# Patient Record
Sex: FEMALE | Race: WHITE | NOT HISPANIC OR LATINO | Employment: FULL TIME | URBAN - METROPOLITAN AREA
[De-identification: names, ages, dates, MRNs, and addresses within clinical notes are randomized per-mention and may not be internally consistent; named-entity substitution may affect disease eponyms.]

---

## 2022-04-12 ENCOUNTER — MEDICAL CORRESPONDENCE (OUTPATIENT)
Dept: HEALTH INFORMATION MANAGEMENT | Facility: CLINIC | Age: 37
End: 2022-04-12
Payer: COMMERCIAL

## 2022-04-19 ENCOUNTER — TRANSCRIBE ORDERS (OUTPATIENT)
Dept: OTHER | Age: 37
End: 2022-04-19

## 2022-04-19 DIAGNOSIS — R49.0 HOARSENESS OF VOICE: Primary | ICD-10-CM

## 2022-05-10 ENCOUNTER — TRANSFERRED RECORDS (OUTPATIENT)
Dept: HEALTH INFORMATION MANAGEMENT | Facility: CLINIC | Age: 37
End: 2022-05-10

## 2022-05-25 NOTE — TELEPHONE ENCOUNTER
FUTURE VISIT INFORMATION      FUTURE VISIT INFORMATION:    Date: 8/22/22    Time: 9AM    [place] [facility] [place]:    Referring provider:  Dr Christopher Clark    Referring providers clinic:  [facility]     Reason for visit/diagnosis  Hoarseness of voice [R49.0], referred by Christopher Clark in GENERIC EXTERNAL DATA DEPARTMENT, recds in epic per pt    RECORDS REQUESTED FROM:       Clinic name Comments Records Status Imaging Status   [facility]   MRN: [id number] 4/11/22, 5/10/21 note from Dr Clark   5/13/21 SLP note from Randa Cazares SLP   req 5/25/22 - received 6/2/22 5/25/22 10:49AM Sent a fax to [facility] for recs - Amay   6/2/22 10:34AM received recs, sent to scan. Will need to req more recent recs closer to appt - Amay   6/29/22 12PM re-sent 22 pages of recs to scan as they are not scanned in the chart - Amay

## 2022-08-09 ENCOUNTER — TELEPHONE (OUTPATIENT)
Dept: OTOLARYNGOLOGY | Facility: CLINIC | Age: 37
End: 2022-08-09

## 2022-08-09 NOTE — TELEPHONE ENCOUNTER
Left vm message for patient in regards to offering a sooner appt. Writers call back number provided on vm.

## 2022-08-22 ENCOUNTER — VIRTUAL VISIT (OUTPATIENT)
Dept: OTOLARYNGOLOGY | Facility: CLINIC | Age: 37
End: 2022-08-22
Attending: OTOLARYNGOLOGY
Payer: COMMERCIAL

## 2022-08-22 ENCOUNTER — TELEPHONE (OUTPATIENT)
Dept: OTOLARYNGOLOGY | Facility: CLINIC | Age: 37
End: 2022-08-22

## 2022-08-22 ENCOUNTER — PRE VISIT (OUTPATIENT)
Dept: OTOLARYNGOLOGY | Facility: CLINIC | Age: 37
End: 2022-08-22

## 2022-08-22 ENCOUNTER — OFFICE VISIT (OUTPATIENT)
Dept: OTOLARYNGOLOGY | Facility: CLINIC | Age: 37
End: 2022-08-22
Payer: COMMERCIAL

## 2022-08-22 VITALS
BODY MASS INDEX: 30.29 KG/M2 | HEART RATE: 57 BPM | HEIGHT: 67 IN | SYSTOLIC BLOOD PRESSURE: 123 MMHG | DIASTOLIC BLOOD PRESSURE: 84 MMHG | WEIGHT: 193 LBS

## 2022-08-22 DIAGNOSIS — J38.02 VOCAL FOLD PARESIS, BILATERAL: ICD-10-CM

## 2022-08-22 DIAGNOSIS — J38.3 GLOTTIC INSUFFICIENCY: ICD-10-CM

## 2022-08-22 DIAGNOSIS — J38.7 LARYNGEAL HYPERFUNCTION: ICD-10-CM

## 2022-08-22 DIAGNOSIS — J38.3 VOCAL FOLD ATROPHY: ICD-10-CM

## 2022-08-22 DIAGNOSIS — R49.0 HOARSENESS: Primary | ICD-10-CM

## 2022-08-22 DIAGNOSIS — R49.0 DYSPHONIA: Primary | ICD-10-CM

## 2022-08-22 PROCEDURE — 31579 LARYNGOSCOPY TELESCOPIC: CPT | Performed by: OTOLARYNGOLOGY

## 2022-08-22 PROCEDURE — 92524 BEHAVRAL QUALIT ANALYS VOICE: CPT | Mod: GN | Performed by: SPEECH-LANGUAGE PATHOLOGIST

## 2022-08-22 PROCEDURE — 92507 TX SP LANG VOICE COMM INDIV: CPT | Mod: GN | Performed by: SPEECH-LANGUAGE PATHOLOGIST

## 2022-08-22 PROCEDURE — 99204 OFFICE O/P NEW MOD 45 MIN: CPT | Mod: 25 | Performed by: OTOLARYNGOLOGY

## 2022-08-22 RX ORDER — PAROXETINE 20 MG/1
20 TABLET, FILM COATED ORAL DAILY
COMMUNITY
Start: 2022-08-11

## 2022-08-22 ASSESSMENT — PAIN SCALES - GENERAL: PAINLEVEL: NO PAIN (0)

## 2022-08-22 NOTE — PROGRESS NOTES
Dear Dr. Clark:    I had the pleasure of meeting Clarice Contreras in consultation at the [facility] of the [facility] at your request, for evaluation of throat concerns. The note from our visit follows. Speech recognition software may have been used in the documentation below; input is reviewed before signature to the best of my ability. I appreciate the opportunity to participate in the care of this pleasant patient.    Please feel free to contact me with any questions.    Sincerely yours,    Serena Wood M.D., M.P.H.  , Laryngology  Director, [facility] Voice [facility]  Otolaryngology- Head & Neck Surgery  [phone]          =====    HISTORY OF PRESENT ILLNESS:   Clarice Contreras is a pleasant 36 year old female who presents with a ~1.5 year history of throat concerns.       Voice  She notes a gradual onset of dysphonia in the spring of 2021, possibly associated with allergies versus possible COVID (Delta). She was seen in 2021 by Dr. 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 She was unable to have formal speech therapy due to insurance issues.    Laryngoscopy 4/11/22: phonatory gap, hyperfunction. It was difficult to tell which side was the weaker side.    She notes increased vocal effort. She works in the ICU. She needs to be heard over loud machinery at work, and runs out of breath when talking quickly. Her symptoms are worse with stress. It is quiet at home.    She is wondering about surgical options.      Swallowing  It feels like thin liquids get down the wrong way about once every two weeks. No pneumonias. She has a strong cough.      Cough/Throat-clearing  No concerns.       Breathing  No concerns.       Throat discomfort  Minimal.      Reflux-type symptoms  She experiences heartburn/indigestion rarely. She is not taking reflux medications.        Prior outside records were reviewed for this visit,  including:  Dr. Clark 5/10/21, 4/12/22  Dr. Randa Cazares 5/13/21    MEDICATIONS:     Current Outpatient Medications   Medication Sig Dispense Refill     PARoxetine (PAXIL) 20 MG tablet Take 20 mg by mouth daily         ALLERGIES:  No Known Allergies    PAST MEDICAL HISTORY: No past medical history on file.     PAST SURGICAL HISTORY: No past surgical history on file.    HABITS/SOCIAL HISTORY:    Social History     Tobacco Use     Smoking status: Not on file     Smokeless tobacco: Not on file   Substance Use Topics     Alcohol use: Not on file     Never smoker    FAMILY HISTORY:  No family history on file. Noncontributory.    REVIEW OF SYSTEMS:  Comprehensive 11 point review of systems was reviewed. Positives are as noted below; pertinent findings are as noted in the HPI.     Patient Supplied Answers to Review of Systems   Noncontributory         PHYSICAL EXAMINATION:  General: The patient was alert and conversant, and in no acute distress.    Eyes: PERRL, conjunctiva and lids normal, sclera nonicteric.  Nose: Anterior rhinoscopy: no gross abnormalities. no  bleeding; no  mucopurulence; septum grossly normal, mild mucoid drainage and/or crusting.  Oral cavity/oropharynx: No masses or lesions. Dentition in good condition. Floor of mouth and oral tongue soft to palpation. Tongue mobility and palate elevation intact and symmetric.  Ears: Normal auricles, external auditory canals bilaterally. Visualized portions of tympanic membranes normal bilaterally.   Neck: No palpable cervical lymphadenopathy. There was no  tenderness to palpation of the thyrohyoid space, which was narrow. No obvious thyroid abnormality. Landmarks palpable.  Resp: Breathing comfortably, no stridor or stertor.  Neuro: Symmetric facial function. Other cranial nerves as documented above.  Psych: Normal affect, pleasant and cooperative.  Voice/speech: Severe dysphonia characterized by breathiness, roughness and strain.  Extremities: No  cyanosis, clubbing, or edema of the upper extremities.    Intake scores  Total Score for Last Patient-Answered VHI Questionnaire  No flowsheet data found.  Total Score for Last Patient-Answered EAT Questionnaire  No flowsheet data found.  Total Score for Last Patient-Answered CSI Questionnaire  No flowsheet data found.      PROCEDURE:   Flexible fiberoptic laryngoscopy and laryngovideostroboscopy  Indications: This procedure was warranted to evaluate the patient's laryngeal anatomy and function. Risks, benefits, and alternatives were discussed.  Description: After written informed consent was obtained, a time-out was performed to confirm patient identity, procedure, and procedure site. Topical 3% lidocaine with 0.25% phenylephrine was applied to the nasal cavities. I performed the endoscopy and no complications were apparent. Continuous and stroboscopic light were utilized to assess routine phonation and variable frequency phonation.  Performed by: Serena Wood MD MPH  SLP: Joie Black MS CCC-SLP   Findings: Normal nasopharynx. Normal base of tongue, valleculae, and epiglottis. Vocal fold mobility: right: full, but intermittently sluggish; left: brisk, with excellent abduction but obviously limited adduction. Medial edges of the vocal folds: smooth and straight on the right; left with obvious severe atrophy, bowing. No focal mucosal lesions were observed on the true vocal folds. Glissade produced appropriate elongation. There was moderate supraglottic recruitment with connected speech. Mucosa of false vocal folds, aryepiglottic folds, piriform sinuses, and posterior glottis unremarkable. Airway was patent. Limited response to therapy probes. No focal lesions on NBI.    The addition of stroboscopy allowed evaluation of the mucosal wave.   Amplitude: right: normal; left: mildly decreased. Symmetry: associated asymmetry. Closure pattern: incomplete. Closure plane: at glottic level. Phase distribution:  open-phase predominant.                                      IMPRESSION AND PLAN:   Clarice Contreras presents with a right true vocal fold with intermittently decreased motion and a severely atrophic left true vocal fold with brisk motion but limited adduction, leading to large phonatory gap.    It is a unusual configuration and findings bilaterally. Recommend MRI of the brain with contrast as well as CT scan of the neck with contrast. The patient is thinking about it.    We also discussed a trial of augmentation. She has a big gap. Her left vocal fold is atrophic. It is hard to predict duration and degree of improvement given the unusual presentation and large gap, but her response would help us consider next steps.  We will have to be mindful of the intermittently limited right true vocal fold motion also. She is very interested. I have placed a case request.    Plan for speech therapy with Joie Black, MS CCC-SLP one to two weeks after.    Addendum: Birthday is actually 1985. Now corrected in chart.    I appreciate the opportunity to participate in the care of this pleasant patient.     Today's visit required additional screening time, PPE, and cleaning measures to allow for a safe in-person visit, due to the public health emergency.    I spent a total of 50 minutes on 8/22/2022 in chart review, review of tests, patient visit, documentation, care coordination, and/or discussion with other providers about the issues documented above, separate from any documented procedure(s).

## 2022-08-22 NOTE — PROGRESS NOTES
"[facility]  Clinical Voice and Upper Airway Evaluation Report    Patient's Name: Clarice Santiago  Date of Evaluation: 8/22/22  Providing SLP: Joie Black MS CCC-SLP  Seen in Conjunction With: Serena Wood MD MPH  Referring Provider and Facility: Christopher Clark MD - [facility]  Chief Complaint: dysphonia  Assessment / Treatment Time: 9:10 - 10:30 AM  Evaluation Location: [facility]  Others in Attendance for the Evaluation: none      Patient History: Clarice is a 36-year-old female who presents today for evaluation of dysphonia.     Dysphonia:     Onset: gradual onset in Spring 2021 around allergy season    Progression: very stable and unchanged since 8 weeks post-onset - no improvements with voice therapy    Initial evaluations    Saw Dr. 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    Runs out of breath while speaking quickly    Worsens with stress    Increased effort    Denies discomfort with phonation    Denies increased throat clearing/coughing and dyspnea    Clarice does have intermittent aspiration of thin liquids about once every 2 weeks and is very aware of this     Goals of today's visit: consider a surgical option, as therapy has not been helpful            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 0 indicated no impairment, 3 indicates a severe impairment. This is a rating based on clinical judgement of disordered voice quality.  G ( 2 ) General Dysphonia     R ( 2 ) Roughness     B ( 2 ) Breathiness     A ( 2 ) Asthenia     S ( 2 ) Strain  Additional information: diplophonia    Laryngeal palpation:     Thyrohyoid space: very mildly compressed with no discomfort    Additional observations:     Coughing / throat clearing: none noted    Breathing patterns: appropriate    Overt tension: none    Habitual pitch: will appear both higher and lower than expected for her age- and gender-matched peers    Pitch range: N/A    Maximum phonation time at normal pitch and loudness on /a/ vowel: N/A    Resonance: back-focused    Loudness: reduced      Laryngoscopy:    Provider performing exam: Serena Wood MD MPH    Informed consent: Informed consent was obtained, which includes potential side-effects, risks, and benefits of the procedure.    Anesthetic: Topical anesthesia with 3% lidocaine and 0.25% phenylephrine was applied the nostrils bilaterally.    Scope type: A distal chip flexible laryngoscope was passed through the nare with halogen light source.    The laryngeal and pharyngeal structures were evaluated for gross appearance, mobility, function, and focal lesions / abnormalities of the associated mucosa.  All findings were within normal limits with the exception of the following salient features:     R Arytenoid Abduction / Adduction: mildly and intermittently sluggish movement    L Arytenoid Abduction / Adduction: incomplete adduction and normal abduction    Mediolateral Compression: noted with phonation R>L    Anteroposterior Compression: noted with phonation    Left (L) Vocal Fold Edge: very thin appearance    Glottic Closure: incomplete    L  Amplitude: reduced    Phase Symmetry: asymmetrical secondary to reduced unilateral amplitude of vibration    Periodicity: L vocal fold is vibrating aperiodically while the R vibrated normally       Therapeutic Techniques Attempted (09015 for Individual Speech Therapy):    Semi-Occluded Vocal Tract Exercises (SOVTs) are a series of exercises aimed to recoordinate respiration, phonation, and resonance to produce an effortless, clear voice. Such exercises include straw phonation with or without water resistance, lip trills, humming/Basic Training Gesture for Resonant Voice Therapy, and transoral lip buzz/Basic Training Gesture for Vocal Function Exercises at comfortable speaking pitches and glissando tasks. These exercises were instructed today under laryngoscopy, and Clarice's voice was unchanged during these tasks. This is a poor prognostic indicator for voice therapy alone.       Impressions and Plan:     Clarice presents today with dysphonia beginning about 1.5 years ago during allergy season. Perceptually, her voice is moderately rough, breathy, weak, and strained with persistent diplophonia in all voice situations. Under laryngoscopy, the L vocal fold is very thin with reduced pliability and adduction to midline as well as aperiodic vibration. The R vocal fold's movement is intermittently and mildly sluggish with increased hyperfunction on this side. Stimulability with various SOVT tasks was not helpful in improving Clarice's voice quality or laryngoscopy findings today. For these reasons, voice therapy is not recommended at this time until surgical intervention and imaging have happened with Dr. Wood's guidance. Re-evaluation is recommended prior to beginning voice therapy at this time.      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

## 2022-08-22 NOTE — LETTER
Date:August 26, 2022      Patient was self referred, no letter generated. Do not send.        [facility] Health Information

## 2022-08-22 NOTE — PATIENT INSTRUCTIONS
1.  You were seen in the ENT Clinic today by . If you have any questions or concerns after your appointment, please call [phone]. Press option #1 for scheduling related needs. Press option #3 for Nurse advice.    2.   has recommended the following:   - schedule surgery. You will be contacted by surgery scheduler for possible dates and times   - MRI brain with contrast. We will contact you with results   - CT neck with contrast. We will contact you with resutls    3.  Plan is to return to clinic for post operative follow up. MATTHEW Vyas LPN  [phone]  [facility] - Otolaryngology

## 2022-08-22 NOTE — LETTER
"8/22/2022       RE: Clarice Contreras  [address] [address]     Dear Colleague,    Thank you for referring your patient, Clarice Contreras, to the [facility]ICE CLINIC [place] at [facility]. Please see a copy of my visit note below.    [facility] Voice [facility]  Clinical Voice and Upper Airway Evaluation Report    Patient's Name: Clarice Santiago  Date of Evaluation: 8/16/22  Providing SLP: Joie Black MS CCC-SLP  Seen in Conjunction With: Serena Wood MD MPH  Referring Provider and Facility: Christopher Clark MD - [facility]  Chief Complaint: dysphonia  Assessment / Treatment Time: 9:10 - 10:30 AM  Evaluation Location: [facility]  Others in Attendance for the Evaluation: none      Patient History: Clarice is a 36-year-old female who presents today for evaluation of dysphonia.     Dysphonia:     Onset: gradual onset in Spring 2021 around allergy season    Progression: very stable and unchanged since 8 weeks post-onset - no improvements with voice therapy    Initial evaluations    Saw Dr. 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    Runs out of breath while speaking quickly    Worsens with stress    Increased effort    Denies discomfort with phonation    Denies increased throat clearing/coughing and dyspnea    Clarice does have intermittent aspiration " of thin liquids about once every 2 weeks and is very aware of this     Goals of today's visit: consider a surgical option, as therapy has not been helpful            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 0 indicated no impairment, 3 indicates a severe impairment. This is a rating based on clinical judgement of disordered voice quality.  G ( 2 ) General Dysphonia     R ( 2 ) Roughness     B ( 2 ) Breathiness     A ( 2 ) Asthenia     S ( 2 ) Strain  Additional information: diplophonia    Laryngeal palpation:     Thyrohyoid space: very mildly compressed with no discomfort    Additional observations:     Coughing / throat clearing: none noted    Breathing patterns: appropriate    Overt tension: none    Habitual pitch: will appear both higher and lower than expected for her age- and gender-matched peers    Pitch range: N/A    Maximum phonation time at normal pitch and loudness on /a/ vowel: N/A    Resonance: back-focused    Loudness: reduced      Laryngoscopy:    Provider performing exam: Serena Wood MD MPH    Informed consent: Informed consent was obtained, which includes potential side-effects, risks, and benefits of the procedure.    Anesthetic: Topical anesthesia with 3% lidocaine and 0.25% phenylephrine was applied the nostrils bilaterally.    Scope type: A distal chip flexible laryngoscope was passed through the nare with halogen light source.    The laryngeal and pharyngeal structures were evaluated for gross appearance, mobility, function, and focal lesions / abnormalities of the associated mucosa.  All findings were within normal limits with the exception of the following salient features:     R Arytenoid Abduction / Adduction: mildly and  intermittently sluggish movement    L Arytenoid Abduction / Adduction: incomplete adduction and normal abduction    Mediolateral Compression: noted with phonation R>L    Anteroposterior Compression: noted with phonation    Left (L) Vocal Fold Edge: very thin appearance    Glottic Closure: incomplete    L Amplitude: reduced    Phase Symmetry: asymmetrical secondary to reduced unilateral amplitude of vibration    Periodicity: L vocal fold is vibrating aperiodically while the R vibrated normally       Therapeutic Techniques Attempted (23872 for Individual Speech Therapy):    Semi-Occluded Vocal Tract Exercises (SOVTs) are a series of exercises aimed to recoordinate respiration, phonation, and resonance to produce an effortless, clear voice. Such exercises include straw phonation with or without water resistance, lip trills, humming/Basic Training Gesture for Resonant Voice Therapy, and transoral lip buzz/Basic Training Gesture for Vocal Function Exercises at comfortable speaking pitches and glissando tasks. These exercises were instructed today under laryngoscopy, and Clarice's voice was unchanged during these tasks. This is a poor prognostic indicator for voice therapy alone.       Impressions and Plan:     Clarice presents today with dysphonia beginning about 1.5 years ago during allergy season. Perceptually, her voice is moderately rough, breathy, weak, and strained with persistent diplophonia in all voice situations. Under laryngoscopy, the L vocal fold is very thin with reduced pliability and adduction to midline as well as aperiodic vibration. The R vocal fold's movement is intermittently and mildly sluggish with increased hyperfunction on this side. Stimulability with various SOVT tasks was not helpful in improving Clarice's voice quality or laryngoscopy findings today. For these reasons, voice therapy is not recommended at this time until surgical intervention and imaging have happened with Dr. Wood's  guidance. Re-evaluation is recommended prior to beginning voice therapy at this time.      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      Sincerely,    Joie Black, SLP

## 2022-08-22 NOTE — LETTER
8/22/2022      RE: Clarice Contreras  [address] [facility] [address]       Dear Dr. Clark:    I had the pleasure of meeting Clarice Contreras in consultation at the [facility] at your request, for evaluation of throat concerns. The note from our visit follows. Speech recognition software may have been used in the documentation below; input is reviewed before signature to the best of my ability. I appreciate the opportunity to participate in the care of this pleasant patient.    Please feel free to contact me with any questions.    Sincerely yours,    Serena Wood M.D., M.P.H.  , Laryngology  Director, [facility]  Otolaryngology- Head & Neck Surgery  [phone]          =====    HISTORY OF PRESENT ILLNESS:   Clarice Contreras is a pleasant 36 year old female who presents with a ~1.5 year history of throat concerns.       Voice  She notes a gradual onset of dysphonia in the spring of 2021, possibly associated with allergies versus possible COVID (Delta). She was seen in 2021 by Dr. 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 She was unable to have formal speech therapy due to insurance issues.    Laryngoscopy 4/11/22: phonatory gap, hyperfunction. It was difficult to tell which side was the weaker side.    She notes increased vocal effort. She works in the ICU. She needs to be heard over loud machinery at work, and runs out of breath when talking quickly. Her symptoms are worse with stress. It is quiet at home.    She is wondering about surgical options.      Swallowing  It feels like thin liquids get down the wrong way about once every two weeks. No pneumonias. She has a strong cough.      Cough/Throat-clearing  No concerns.       Breathing  No concerns.       Throat discomfort  Minimal.      Reflux-type symptoms  She experiences heartburn/indigestion rarely. She is not  taking reflux medications.        Prior outside records were reviewed for this visit, including:  Dr. Clark 5/10/21, 4/12/22  Dr. Randa Cazares 5/13/21    MEDICATIONS:     Current Outpatient Medications   Medication Sig Dispense Refill     PARoxetine (PAXIL) 20 MG tablet Take 20 mg by mouth daily         ALLERGIES:  No Known Allergies    PAST MEDICAL HISTORY: No past medical history on file.     PAST SURGICAL HISTORY: No past surgical history on file.    HABITS/SOCIAL HISTORY:    Social History     Tobacco Use     Smoking status: Not on file     Smokeless tobacco: Not on file   Substance Use Topics     Alcohol use: Not on file     Never smoker    FAMILY HISTORY:  No family history on file. Noncontributory.    REVIEW OF SYSTEMS:  Comprehensive 11 point review of systems was reviewed. Positives are as noted below; pertinent findings are as noted in the HPI.     Patient Supplied Answers to Review of Systems   Noncontributory         PHYSICAL EXAMINATION:  General: The patient was alert and conversant, and in no acute distress.    Eyes: PERRL, conjunctiva and lids normal, sclera nonicteric.  Nose: Anterior rhinoscopy: no gross abnormalities. no  bleeding; no  mucopurulence; septum grossly normal, mild mucoid drainage and/or crusting.  Oral cavity/oropharynx: No masses or lesions. Dentition in good condition. Floor of mouth and oral tongue soft to palpation. Tongue mobility and palate elevation intact and symmetric.  Ears: Normal auricles, external auditory canals bilaterally. Visualized portions of tympanic membranes normal bilaterally.   Neck: No palpable cervical lymphadenopathy. There was no  tenderness to palpation of the thyrohyoid space, which was narrow. No obvious thyroid abnormality. Landmarks palpable.  Resp: Breathing comfortably, no stridor or stertor.  Neuro: Symmetric facial function. Other cranial nerves as documented above.  Psych: Normal affect, pleasant and cooperative.  Voice/speech: Severe  dysphonia characterized by breathiness, roughness and strain.  Extremities: No cyanosis, clubbing, or edema of the upper extremities.    Intake scores  Total Score for Last Patient-Answered VHI Questionnaire  No flowsheet data found.  Total Score for Last Patient-Answered EAT Questionnaire  No flowsheet data found.  Total Score for Last Patient-Answered CSI Questionnaire  No flowsheet data found.      PROCEDURE:   Flexible fiberoptic laryngoscopy and laryngovideostroboscopy  Indications: This procedure was warranted to evaluate the patient's laryngeal anatomy and function. Risks, benefits, and alternatives were discussed.  Description: After written informed consent was obtained, a time-out was performed to confirm patient identity, procedure, and procedure site. Topical 3% lidocaine with 0.25% phenylephrine was applied to the nasal cavities. I performed the endoscopy and no complications were apparent. Continuous and stroboscopic light were utilized to assess routine phonation and variable frequency phonation.  Performed by: Serena Wood MD MPH  SLP: Joie Black MS CCC-SLP   Findings: Normal nasopharynx. Normal base of tongue, valleculae, and epiglottis. Vocal fold mobility: right: full, but intermittently sluggish; left: brisk, with excellent abduction but obviously limited adduction. Medial edges of the vocal folds: smooth and straight on the right; left with obvious severe atrophy, bowing. No focal mucosal lesions were observed on the true vocal folds. Glissade produced appropriate elongation. There was moderate supraglottic recruitment with connected speech. Mucosa of false vocal folds, aryepiglottic folds, piriform sinuses, and posterior glottis unremarkable. Airway was patent. Limited response to therapy probes. No focal lesions on NBI.    The addition of stroboscopy allowed evaluation of the mucosal wave.   Amplitude: right: normal; left: mildly decreased. Symmetry: associated asymmetry. Closure  pattern: incomplete. Closure plane: at glottic level. Phase distribution: open-phase predominant.                                      IMPRESSION AND PLAN:   Clarice Contreras presents with a right true vocal fold with intermittently decreased motion and a severely atrophic left true vocal fold with brisk motion but limited adduction, leading to large phonatory gap.    It is a unusual configuration and findings bilaterally. Recommend MRI of the brain with contrast as well as CT scan of the neck with contrast. The patient is thinking about it.    We also discussed a trial of augmentation. She has a big gap. Her left vocal fold is atrophic. It is hard to predict duration and degree of improvement given the unusual presentation and large gap, but her response would help us consider next steps.  We will have to be mindful of the intermittently limited right true vocal fold motion also. She is very interested. I have placed a case request.    Plan for speech therapy with Joie Black MS CCC-SLP one to two weeks after.    Addendum: Birthday is actually 1985. Now corrected in chart.    I appreciate the opportunity to participate in the care of this pleasant patient.     Today's visit required additional screening time, PPE, and cleaning measures to allow for a safe in-person visit, due to the public health emergency.    I spent a total of 50 minutes on 8/22/2022 in chart review, review of tests, patient visit, documentation, care coordination, and/or discussion with other providers about the issues documented above, separate from any documented procedure(s).        Serena Wood MD

## 2022-08-24 ENCOUNTER — TELEPHONE (OUTPATIENT)
Dept: OTOLARYNGOLOGY | Facility: CLINIC | Age: 37
End: 2022-08-24

## 2022-08-24 NOTE — TELEPHONE ENCOUNTER
Attempted to contact patient regarding scheduling surgery/procedure with Dr. Wood. Phone kept ringing unable to leave VM.     Lori Claire on 8/24/2022 at 2:20 PM   P: [phone]

## 2022-08-26 ENCOUNTER — ANCILLARY PROCEDURE (OUTPATIENT)
Dept: MRI IMAGING | Facility: CLINIC | Age: 37
End: 2022-08-26
Attending: OTOLARYNGOLOGY
Payer: COMMERCIAL

## 2022-08-26 ENCOUNTER — ANCILLARY PROCEDURE (OUTPATIENT)
Dept: CT IMAGING | Facility: CLINIC | Age: 37
End: 2022-08-26
Attending: OTOLARYNGOLOGY
Payer: COMMERCIAL

## 2022-08-26 DIAGNOSIS — R49.0 HOARSENESS: ICD-10-CM

## 2022-08-26 PROCEDURE — A9585 GADOBUTROL INJECTION: HCPCS | Mod: JW | Performed by: RADIOLOGY

## 2022-08-26 PROCEDURE — 70553 MRI BRAIN STEM W/O & W/DYE: CPT | Mod: TC | Performed by: RADIOLOGY

## 2022-08-26 PROCEDURE — 70491 CT SOFT TISSUE NECK W/DYE: CPT | Mod: TC | Performed by: RADIOLOGY

## 2022-08-26 RX ORDER — GADOBUTROL 604.72 MG/ML
8.5 INJECTION INTRAVENOUS ONCE
Status: COMPLETED | OUTPATIENT
Start: 2022-08-26 | End: 2022-08-26

## 2022-08-26 RX ORDER — IOPAMIDOL 755 MG/ML
500 INJECTION, SOLUTION INTRAVASCULAR ONCE
Status: COMPLETED | OUTPATIENT
Start: 2022-08-26 | End: 2022-08-26

## 2022-08-26 RX ADMIN — Medication 61 ML: at 08:53

## 2022-08-26 RX ADMIN — GADOBUTROL 8.5 ML: 604.72 INJECTION INTRAVENOUS at 09:12

## 2022-08-26 RX ADMIN — IOPAMIDOL 100 ML: 755 INJECTION, SOLUTION INTRAVASCULAR at 08:53

## 2022-09-06 NOTE — TELEPHONE ENCOUNTER
Left message regarding scheduling surgery/procedure with Dr. Wood. Writer left call back number on the patients voicemail.     Lori Claire on 9/6/2022 at 11:20 AM   P: [phone]

## 2022-10-31 ENCOUNTER — TELEPHONE (OUTPATIENT)
Dept: OTOLARYNGOLOGY | Facility: CLINIC | Age: 37
End: 2022-10-31

## 2022-10-31 PROBLEM — J38.02 VOCAL FOLD PARESIS, BILATERAL: Status: ACTIVE | Noted: 2022-10-31

## 2022-10-31 PROBLEM — R49.0 HOARSENESS: Status: ACTIVE | Noted: 2022-10-31

## 2022-11-21 ENCOUNTER — HEALTH MAINTENANCE LETTER (OUTPATIENT)
Age: 37
End: 2022-11-21

## 2022-12-01 ENCOUNTER — HOSPITAL ENCOUNTER (OUTPATIENT)
Facility: AMBULATORY SURGERY CENTER | Age: 37
Discharge: HOME | End: 2022-12-01
Attending: OTOLARYNGOLOGY | Admitting: OTOLARYNGOLOGY
Payer: COMMERCIAL

## 2022-12-01 VITALS
HEART RATE: 57 BPM | RESPIRATION RATE: 16 BRPM | DIASTOLIC BLOOD PRESSURE: 88 MMHG | TEMPERATURE: 98 F | WEIGHT: 180 LBS | SYSTOLIC BLOOD PRESSURE: 149 MMHG | OXYGEN SATURATION: 98 % | HEIGHT: 67 IN | BODY MASS INDEX: 28.25 KG/M2

## 2022-12-01 DIAGNOSIS — J38.02 VOCAL FOLD PARESIS, BILATERAL: ICD-10-CM

## 2022-12-01 DIAGNOSIS — R49.0 HOARSENESS: ICD-10-CM

## 2022-12-01 PROCEDURE — 31574 LARGSC W/NJX AUGMENTATION: CPT | Mod: LT

## 2022-12-01 PROCEDURE — 31574 LARGSC W/NJX AUGMENTATION: CPT | Mod: LT | Performed by: OTOLARYNGOLOGY

## 2022-12-01 DEVICE — IMP VOCAL CORD PROLARYN GEL INJ 1ML 8602MOK5: Type: IMPLANTABLE DEVICE | Site: THROAT | Status: FUNCTIONAL

## 2022-12-01 RX ORDER — LIDOCAINE HYDROCHLORIDE AND EPINEPHRINE 10; 10 MG/ML; UG/ML
INJECTION, SOLUTION INFILTRATION; PERINEURAL PRN
Status: DISCONTINUED | OUTPATIENT
Start: 2022-12-01 | End: 2022-12-01 | Stop reason: HOSPADM

## 2022-12-01 NOTE — OP NOTE
PROCEDURE: Flexible fiberoptic transnasal laryngoscopy with percutaneous transcervical Prolaryn Gel injection to left true vocal fold.   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   POSTOPERATIVE DIAGNOSIS: Same.   SURGEON: Serena Wood MD.   ASSISTANT: Lyn Pollard MD.  INDICATIONS: The patient is 37 year old female with dysphonia who presented with a unilateral vocal fold motion impairment and large glottic gap. We discussed options for intervention, and the patient opted for a vocal fold injection after hearing about the risks, benefits, and alternatives. The injection was performed under fiberoptic visualization, which was necessary to confirm appropriate placement of the injectate within the vocal fold with preservation of an adequate airway. The injection was performed percutaneously to minimize infection risk.  FINDINGS: Left true vocal fold with bowed closure, good abduction. Right true vocal fold with mild to moderate paresis. A total of 0.5 cc of Prolaryn Gel was injected into the left true vocal fold(s) with excellent medialization.  DESCRIPTION OF PROCEDURE: After written informed consent was obtained, a time-out was performed to confirm patient identity, procedure, and procedure site. Two-three atomizer puffs of topical 3% lidocaine with 0.25% phenylephrine was applied to the patient's nasal cavities bilaterally. Then the skin was cleaned with an alcohol pad and 0.5 cc of 1% lidocaine with 1:100,000 epinephrine was used to inject the precricoid and pre-thyroid cartilage regions for local anesthesia and vasoconstriction. I then inserted and advanced the transnasal flexible laryngoscope to allow visualization of the larynx.  My assistant stabilized the laryngoscope to allow persistent visualization of the larynx. I entered the cricothyroid space approximately 1 cm to the  left of midline and traveled under the thyroid cartilage to position the  needle to allow injection of the material for medialization. Under visualization with the flexible fiberoptic laryngoscope, the needle was repositioned as needed until appropriate location was visualized. The location of the needle was visualized by transmitted motion through the body of the vocal fold. The injection allowed excellent medialization of the vocal fold, with slight intentional overcorrection.  At the conclusion of the injection, the patient was noted to have a mildly tight voice quality. The airway remained patent.   COMPLICATIONS: None apparent.   DISPOSITION: Stable to home.   PLAN: Return to clinic in ~3 weeks.

## 2022-12-01 NOTE — H&P
Abbreviated H&P for ASC Procedure under Local Anesthesia    1. Chief complaint and/or reason for procedure  Dysphonia  Unilateral vocal fold paralysis, unilateral vocal fold paresis    2. Medical history describing significant medical conditions and previous surgeries    PAST MEDICAL HISTORY: History reviewed. No pertinent past medical history.     PAST SURGICAL HISTORY: History reviewed. No pertinent surgical history.    Health history changes since last seen? NA    3. Current medications and allergies  MEDICATIONS:     Current Outpatient Medications   Medication Sig Dispense Refill     PARoxetine (PAXIL) 20 MG tablet Take 20 mg by mouth daily         ALLERGIES:  No Known Allergies    4. Review of systems  Noncontributory    5. Physical examination  Vital signs stable.   Awake, alert, conversant.  Breathing comfortably, no stridor/stertor.  Voice with moderate to severe dysphonia characterized by roughness, breathiness, strain, diplophonia.    6. ASA classification  ASA I    Plan to proceed as scheduled.

## 2022-12-01 NOTE — DISCHARGE INSTRUCTIONS
[facility]  Home Care Following Your Procedure  Call a doctor if you have signs of infection (fever, growing tenderness at the surgery site, a large amount of drainage or bleeding, severe pain, foul-smelling drainage, redness, swelling).         Tylenol/Acetaminophen Consumption  To help encourage the safe use of acetaminophen, the makers of TYLENOL  have lowered the maximum daily dose for single-ingredient Extra Strength TYLENOL  (acetaminophen) products sold in the [place] from 8 pills per day (4,000 mg) to 6 pills per day (3,000 mg). The dosing interval has also changed from 2 pills every 4-6 hours to 2 pills every 6 hours.  If you feel your pain relief is insufficient, you may take Tylenol/Acetaminophen in addition to your narcotic pain medication.   Be careful not to exceed 3,000 mg of Tylenol/Acetaminophen in a 24 hour period from all sources.  If you are taking extra strength Tylenol/acetaminophen (500 mg), the maximum dose is 6 tablets in 24 hours.  If you are taking regular strength acetaminophen (325 mg), the maximum dose is 9 tablets in 24 hours.    Your doctor is:       Dr. Serena Wood, ENT Otolaryngology: [phone]             Or dial [phone] and ask for the resident on call for:  ENT Otolaryngology  For emergency care, call the:  [place]:  [phone] (TTY for hearing impaired: [phone])

## 2023-01-02 ENCOUNTER — OFFICE VISIT (OUTPATIENT)
Dept: OTOLARYNGOLOGY | Facility: CLINIC | Age: 38
End: 2023-01-02
Payer: COMMERCIAL

## 2023-01-02 VITALS
SYSTOLIC BLOOD PRESSURE: 123 MMHG | OXYGEN SATURATION: 99 % | WEIGHT: 190 LBS | DIASTOLIC BLOOD PRESSURE: 80 MMHG | BODY MASS INDEX: 29.76 KG/M2 | HEART RATE: 62 BPM | TEMPERATURE: 98 F | RESPIRATION RATE: 16 BRPM

## 2023-01-02 DIAGNOSIS — J38.3 VOCAL FOLD ATROPHY: Primary | ICD-10-CM

## 2023-01-02 DIAGNOSIS — J38.02 VOCAL FOLD PARESIS, BILATERAL: ICD-10-CM

## 2023-01-02 DIAGNOSIS — J38.3 VOCAL FOLD ATROPHY: ICD-10-CM

## 2023-01-02 DIAGNOSIS — J38.7 LARYNGEAL HYPERFUNCTION: ICD-10-CM

## 2023-01-02 DIAGNOSIS — R49.0 DYSPHONIA: Primary | ICD-10-CM

## 2023-01-02 DIAGNOSIS — J38.3 GLOTTIC INSUFFICIENCY: ICD-10-CM

## 2023-01-02 DIAGNOSIS — R49.0 DYSPHONIA: ICD-10-CM

## 2023-01-02 PROCEDURE — 99214 OFFICE O/P EST MOD 30 MIN: CPT | Mod: 25 | Performed by: OTOLARYNGOLOGY

## 2023-01-02 PROCEDURE — 92524 BEHAVRAL QUALIT ANALYS VOICE: CPT | Mod: GN | Performed by: SPEECH-LANGUAGE PATHOLOGIST

## 2023-01-02 PROCEDURE — 31579 LARYNGOSCOPY TELESCOPIC: CPT | Performed by: OTOLARYNGOLOGY

## 2023-01-02 ASSESSMENT — PAIN SCALES - GENERAL: PAINLEVEL: NO PAIN (0)

## 2023-01-02 NOTE — PROGRESS NOTES
"Dear Colleague:    Clarice Contreras recently returned for follow-up at the [facility]. My clinic note from our visit is enclosed below.  Speech recognition software may have been used in the documentation below; input is reviewed before signature to the best of my ability.     I appreciate the ongoing opportunity to participate in this patient's care.    Please feel free to contact me with any questions.    Sincerely yours,      Serena Wood M.D., M.P.H.  , Laryngology  Director, [facility]  Otolaryngology- Head & Neck Surgery  [phone]            =====  HISTORY OF PRESENT ILLNESS:  Clarice Contreras is a pleasant 37-year-old female with right true vocal fold with intermittently decreased motion and a severely atrophic left true vocal fold with brisk motion but limited adduction, leading to large phonatory gap.    Given the unusual configuration and bilateral findings we completed a CT scan of the neck with contrast and MRI scan of the brain with contrast in August 2022 and both were unremarkable.    She is status post flexible fiberoptic transnasal laryngoscopy with percutaneous transcervical Prolaryn Gel injection to left true vocal fold on 12/1/22.    Today's updates:  1) Her voice felt 70% back to normal for the first week.  2) She then got COVID. During that time, it seemed like her voice was even better (85-90%) for a few days. She did have some coughing during that time.  3) Then she was back to 70% for three weeks.  4) She was feeling less short of breath now since the injection.  5) Her swallowing feels good, and aspiration is decreased.  6) Her \"high\" voice is easier with reduced effort. She uses this voice mostly.  7) Her \"low\" voice is ventricular.  8) Her voice is now back to about 50% of normal, which is still better than the 25% she was at prior to injection.      MEDICATIONS:     Current Outpatient Medications   Medication Sig " Dispense Refill     PARoxetine (PAXIL) 20 MG tablet Take 20 mg by mouth daily         ALLERGIES:  No Known Allergies    NEW PMH/PSH: None    REVIEW OF SYSTEMS:  The patient completed a comprehensive 11 point review of systems (below), which was reviewed. Positives are as noted below.  Patient Supplied Answers to Review of Systems   ENT ROS 12/28/2022   Ears, Nose, Throat: Hoarseness          PHYSICAL EXAM:  General: The patient was alert and conversant, and in no acute distress.    Oral cavity/oropharynx: No masses or lesions. Dentition unchanged since prior. Tongue mobility and palate elevation intact and symmetric.  Neck: No palpable cervical lymphadenopathy, no significant tenderness to palpation of the thyrohyoid space, which was narrow. No obvious thyroid abnormality.  Resp: Breathing comfortably, no stridor or stertor.  Neuro: Symmetric facial function. Other cranial nerve function as documented above.  Psych: Normal affect, pleasant and cooperative.  Voice/speech: Moderate to severe dysphonia characterized by breathiness, roughness, strain and pitch instability.      Procedure:   Flexible fiberoptic laryngoscopy and laryngovideostroboscopy  Indications: This procedure was warranted to evaluate the patient's laryngeal anatomy and function. Risks, benefits, and alternatives were discussed.  Description: After written informed consent was obtained, a time-out was performed to confirm patient identity, procedure, and procedure site. Topical 3% lidocaine with 0.25% phenylephrine was applied to the nasal cavities. I performed the endoscopy and no complications were apparent. Continuous and stroboscopic light were utilized to assess routine phonation and variable frequency phonation.  Performed by: Serena Wood MD MPH  SLP: CHADD Clarke, MS, CCC-SLP   Findings: Normal nasopharynx. Normal base of tongue, valleculae, and epiglottis. Vocal fold mobility: right: impaired; left: full and brisk abduction, limited  adduction. Medial edges of the vocal folds: smooth and straight on the right; left with bowed atrophic appearance; some injectate still apparent in inferior aspect of vocal fold. No focal mucosal lesions were observed on the true vocal folds. Glissade produced appropriate elongation. There was moderate supraglottic recruitment with connected speech. Mucosa of false vocal folds, aryepiglottic folds, piriform sinuses, and posterior glottis unremarkable. Airway was patent.   No lesions on NBI.    The addition of stroboscopy allowed evaluation of the mucosal wave.   Amplitude: right: normal; left: normal. Symmetry: intermittent symmetry. Closure pattern: incomplete. Closure plane: at glottic level. Phase distribution: open-phase predominant.                                IMPRESSION AND PLAN:   Clarice VON Ruizjeremycristina returns reporting substantial improvement in response to trial vocal fold augmentation. We discussed options for next steps and various advantages and disadvantages of them. We noted that there are a variety of materials that can be used for longer term medialization. We acknowledged that one challenge is the limited motion on the right, which remains unexplained, and the fact that we do not know if it might progress. This issue may make it safer to use a removable material for augmentation. I am currently leaning towards Silastic implantation for more specific control of contour and option of removal, although it does have some viscoelastic limitations compared to some of the other options. We can discuss this more further over time.    For now, we planned for her to follow up in April by which time we anticipate the recent injection material to be fully resorbed. In the meantime, she will do some speech therapy with CHDAD Clarke, MS, CCC-SLP to optimize voice efficiency, which is both helpful now and for potential intra-operative voicing if we proceed to more permanent medialization.    She was comfortable  with this plan and will let me know if questions or concerns arise in the meantime. I appreciate the opportunity to participate in the care of this pleasant patient.     Today's visit required additional screening time, PPE, and cleaning measures to allow for a safe in-person visit, due to the public health emergency. I spent a total of 37 minutes on 1/2/2023 in chart review, review of tests, patient visit, documentation, care coordination, and/or discussion with other providers about the issues documented above, separate from any documented procedure(s).

## 2023-01-02 NOTE — LETTER
Date:January 10, 2023      Patient was self referred, no letter generated. Do not send.        [facility] Health Information

## 2023-01-02 NOTE — LETTER
"1/2/2023      RE: Clarice Contreras  [address] [facility] [address]       Dear Colleague:    Clarice Contreras recently returned for follow-up at the [facility]. My clinic note from our visit is enclosed below.  Speech recognition software may have been used in the documentation below; input is reviewed before signature to the best of my ability.     I appreciate the ongoing opportunity to participate in this patient's care.    Please feel free to contact me with any questions.    Sincerely yours,      Serena Wood M.D., M.P.H.  , Laryngology  Director, [facility]  Otolaryngology- Head & Neck Surgery  [phone]            =====  HISTORY OF PRESENT ILLNESS:  Clarice Contreras is a pleasant 37-year-old female with right true vocal fold with intermittently decreased motion and a severely atrophic left true vocal fold with brisk motion but limited adduction, leading to large phonatory gap.    Given the unusual configuration and bilateral findings we completed a CT scan of the neck with contrast and MRI scan of the brain with contrast in August 2022 and both were unremarkable.    She is status post flexible fiberoptic transnasal laryngoscopy with percutaneous transcervical Prolaryn Gel injection to left true vocal fold on 12/1/22.    Today's updates:  1) Her voice felt 70% back to normal for the first week.  2) She then got COVID. During that time, it seemed like her voice was even better (85-90%) for a few days. She did have some coughing during that time.  3) Then she was back to 70% for three weeks.  4) She was feeling less short of breath now since the injection.  5) Her swallowing feels good, and aspiration is decreased.  6) Her \"high\" voice is easier with reduced effort. She uses this voice mostly.  7) Her \"low\" voice is ventricular.  8) Her voice is now back to about 50% of normal, which is still better than the 25% she was at prior " to injection.      MEDICATIONS:     Current Outpatient Medications   Medication Sig Dispense Refill     PARoxetine (PAXIL) 20 MG tablet Take 20 mg by mouth daily         ALLERGIES:  No Known Allergies    NEW PMH/PSH: None    REVIEW OF SYSTEMS:  The patient completed a comprehensive 11 point review of systems (below), which was reviewed. Positives are as noted below.  Patient Supplied Answers to Review of Systems   ENT ROS 12/28/2022   Ears, Nose, Throat: Hoarseness          PHYSICAL EXAM:  General: The patient was alert and conversant, and in no acute distress.    Oral cavity/oropharynx: No masses or lesions. Dentition unchanged since prior. Tongue mobility and palate elevation intact and symmetric.  Neck: No palpable cervical lymphadenopathy, no significant tenderness to palpation of the thyrohyoid space, which was narrow. No obvious thyroid abnormality.  Resp: Breathing comfortably, no stridor or stertor.  Neuro: Symmetric facial function. Other cranial nerve function as documented above.  Psych: Normal affect, pleasant and cooperative.  Voice/speech: Moderate to severe dysphonia characterized by breathiness, roughness, strain and pitch instability.      Procedure:   Flexible fiberoptic laryngoscopy and laryngovideostroboscopy  Indications: This procedure was warranted to evaluate the patient's laryngeal anatomy and function. Risks, benefits, and alternatives were discussed.  Description: After written informed consent was obtained, a time-out was performed to confirm patient identity, procedure, and procedure site. Topical 3% lidocaine with 0.25% phenylephrine was applied to the nasal cavities. I performed the endoscopy and no complications were apparent. Continuous and stroboscopic light were utilized to assess routine phonation and variable frequency phonation.  Performed by: Serena Wood MD MPH  SLP: CHADD Clarke, MS, CCC-SLP   Findings: Normal nasopharynx. Normal base of tongue, valleculae, and  epiglottis. Vocal fold mobility: right: impaired; left: full and brisk abduction, limited adduction. Medial edges of the vocal folds: smooth and straight on the right; left with bowed atrophic appearance; some injectate still apparent in inferior aspect of vocal fold. No focal mucosal lesions were observed on the true vocal folds. Glissade produced appropriate elongation. There was moderate supraglottic recruitment with connected speech. Mucosa of false vocal folds, aryepiglottic folds, piriform sinuses, and posterior glottis unremarkable. Airway was patent.   No lesions on NBI.    The addition of stroboscopy allowed evaluation of the mucosal wave.   Amplitude: right: normal; left: normal. Symmetry: intermittent symmetry. Closure pattern: incomplete. Closure plane: at glottic level. Phase distribution: open-phase predominant.                                IMPRESSION AND PLAN:   Clarice Contreras returns reporting substantial improvement in response to trial vocal fold augmentation. We discussed options for next steps and various advantages and disadvantages of them. We noted that there are a variety of materials that can be used for longer term medialization. We acknowledged that one challenge is the limited motion on the right, which remains unexplained, and the fact that we do not know if it might progress. This issue may make it safer to use a removable material for augmentation. I am currently leaning towards Silastic implantation for more specific control of contour and option of removal, although it does have some viscoelastic limitations compared to some of the other options. We can discuss this more further over time.    For now, we planned for her to follow up in April by which time we anticipate the recent injection material to be fully resorbed. In the meantime, she will do some speech therapy with CHADD Clarke, MS, CCC-SLP to optimize voice efficiency, which is both helpful now and for potential  intra-operative voicing if we proceed to more permanent medialization.    She was comfortable with this plan and will let me know if questions or concerns arise in the meantime. I appreciate the opportunity to participate in the care of this pleasant patient.     Today's visit required additional screening time, PPE, and cleaning measures to allow for a safe in-person visit, due to the public health emergency. I spent a total of 37 minutes on 1/2/2023 in chart review, review of tests, patient visit, documentation, care coordination, and/or discussion with other providers about the issues documented above, separate from any documented procedure(s).        Serena Wood MD

## 2023-01-02 NOTE — PROGRESS NOTES
[facility] CL[facility]  Wilder Skinner Jr., M.D., F.A.C.S.  Serena Wood M.D., M.P.H.  Nicolasa Powell M.D.  Candis Jones, Ph.D., CCC-SLP  Santosh Rainey, Ph.D., CCC-SLP  Ana Sorensen M.M. (voice), M.A., CCC-SLP  Vibha Encinas M.S., CCC-SLP  Joie Black M.S., CCC-SLP  DAYAN Clarke (voice), M.S., CCC-SLP  [facility] VOICE [facility]  CLINICAL EVALUATION REPORT    Patient: Clarice Contreras  Date of Service: 1/2/2023  Clinician: Stephan Clarke, MS, CCC-SLP  Seen in conjunction with: Dr. Serena Wood  Referring physician: Christopher Clark MD  Primary Insurance: [employer]  Visit Count: 1      HISTORY  PATIENT INFORMATION  Clarice Contreras is a 37 year old female presenting today for re-evaluation of dysphonia secondary to unilateral paralysis and unilateral paresis following prolaryn voice gel injection for vocal fold augmentation on 12/1/22. Salient details of her symptom history are as follows:    Chief complaint: Clarice feels like she's definitely louder than she was post-injection, but she had Covid-19 a week later. She lost her smell and taste, had coughing with congestion, but her breathing was okay. She initially felt like her voice was about 75% back to normal right after the filler, then when she had Covid, her voice got even better due to inflammation- about 85-90%. She did not feel like her breathing was impacted at that point. Coworkers were commenting that her voice was back to normal, other than sounding congested.   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 She did feel like her voice improved to about 70% with the filler, 85-90% with added inflammation due to Covid, and then started to lose intensity after 3 weeks. She's at about 50% of her  "normal voice today, versus the 25% she was at previously.     COUGH/THROAT CLEAR: She only coughed for about a week during covid, otherwise no concerns.     SWALLOWING: She was previously aspirating 1x every 1-2 weeks prior to filler, but has only choked on liquid 1x in the past 3 weeks and it wasn't as severe of a choking episode.     BREATHING: She was previously SOB when speaking before the filler was injection. She only gets SOB and light-headed now when she's talking very animatedly. She ran 6 miles today and was fine, without SOB.     OTHER PERTINENT HISTORY    Medications: none related to symptoms    Please also refer to Dr. Serena Wood's dictation.     No past medical history on file.  Past Surgical History:   Procedure Laterality Date     LARYNGOSCOPY, FLEXIBLE WITH INJECTION Left 12/1/2022    Procedure: LARYNGOSCOPY, FLEXIBLE WITH INJECTION;  Surgeon: Serena Wood MD;  Location: [facility] OR       OBJECTIVE FINDINGS  Patient Supplied Answers To VHI Questionnaire  Voice Handicap Index (VHI-10) 1/1/2023   My voice makes it difficult for people to hear me 3   People have difficulty understanding me in a noisy room 3   My voice difficulties restrict my personal and social life.  2   I feel left out of conversations because of my voice 2   My voice problem causes me to lose income 2   I feel as though I have to strain to produce voice 3   The clarity of my voice is unpredictable 3   My voice problem upsets me 2   My voice makes me feel handicapped 2   People ask, \"What's wrong with your voice?\" 4   VHI-10 26     PERCEPTUAL EVALUATION (44166)  VOICE/ SPEECH/ NON-COMMUNICATIVE LARYNGEAL BEHAVIORS EVALUATION    Palpation of the laryngeal area shows:    firm musculature    reduced thyrohoid space    AP palpation of the thyrohyoid area improves voice quality    Breathing pattern:    clavicular elevation during inspiration, shoulder and neck involvement and incoordination with phonation    Tension:    is " evident in the neck and shoulders    Cough/ Throat clear:    throat clear is primary and observed rarely during today's session     Clarice states today is a typical voice day, with clinician observing voice quality characterized by:    Roughness: Mild to moderate Intermittent    Breathiness: Severe    Strain: Moderate to severe    Habitual pitch is 220 and is WNL    Pitch glide reveals range of 110 to 370 Hz    Loudness is significantly reduced for the setting    Maximum Phonation Time: 6 seconds    GLOBAL ASSESSMENT OF DYSPHONIA: 85/100    GRADE, ROUGHNESS, BREATHINESS, ASTHENIA, STRAIN  (GRBAS) scale: G(85)R(45)B(48)A(55)S(50)    LARYNGEAL EXAMINATION  Procedure: Flexible endoscopy with chip-tip technology with stroboscopy, right nostril; topical anesthesia with 3% Lidocaine and 0.25% phenylephrine was applied.   Performed by: Dr. Serena Wood  Verbal consent was obtained and witnessed prior to this procedure.   A time-out was performed, verifying patient, procedure, and site.     This exam shows:    Velar Function: WNL    Secretions:  mild collection of secretions on the laryngopharyngeal structures    Laryngeal Mucosa: mild edema and erythema of the arytenoids and posterior cricoid region and posterior commissure hypertrophy    Vocal fold mucosa:    o RTVF - mild edema and erythema located diffusely and along the length of the vibratory margin  o LTVF - atrophic and thinned    Vocal fold function:   o Right vocal fold appears to have full ROM, but movement is sluggish and at times reduced and Left vocal fold is paretic in the paramedian position    Narrow Band Imaging (NBI) demonstrated: Findings consistent with halogen light    Airway is patent as visualized below the glottis    Elongation of the vocal folds for pitch increase is tilted toward the left    mod-severe medial ventricular constrictive supraglottic hyperfunction of the right ventricular fold during connected speech    The addition of  stroboscopy provided the following information:    Vibratory Behavior: mod-severely decreased on Bilateral sides    Periodicity: Predominantly aperiodic with brief moments of periodicity    Symmetry:  associated asymmetry    Amplitude Right: moderately reduced    Amplitude Left: moderately reduced    Mucosal Wave Right: moderately reduced    Mucosal Wave Left: moderately reduced    Glottic closure:  on phonation glottic closure is incomplete   o Closure Pattern: incomplete  o Closure Plane: at glottic level    Phase Distribution: open-phase predominant     STIMULABILITY: results of therapy probes during perceptual and laryngeal evaluation demonstrate improvement with Reduced hyperfunction with use of yawn-sigh.    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 Unfortunately, there has not been a particular etiology discovered yet, despite neck CT and MRI brain scan in August 2022, which were both normal. She underwent a left vocal fold prolaryn gel injection by Dr. Wood on 12/2/22. Laryngoscopy revealed that the left vocal fold is atrophic and paretic in the paramedial position, while the right vocal fold demonstrates compensatory mod-severe supraglottic hyperfunction, but a large glottic gap remains. The right vocal fold function is also inconsistent and occasionally demonstrates some sluggish and reduced movement, but overall displays full ROM. She perceptually demonstrated severely breathy voice quality with moderate strain and mild-moderate inconsistent roughness, large pitch fluctuation between a lower than normal register (117Hz) and higher than normal register (370Hz), poor respiratory mechanics with excessive effort and air flow, and perilaryngeal hyperfunction. She did  benefit from AP and lateral laryngeal palpation along with visual feedback of air flow using a streamer. Given the inconsistent nature of her right vocal fold movement and unknown etiology, Dr. Wood expressed some hesitation to do a permanent vocal fold implant, but will plan to reassess and decide on the next step once the current prolaryn gel has fully reabsorbed.     She would benefit from a course of speech therapy targeting reduced perilaryngeal hyperfunction, improved respiratory mechanics, and strategies to optimize her voice production while reduced strain and effort. Due to concern of airway impairment should she undergo a permanent implant procedure and then potentially have worsening vocal fold function, possibly leading to one or both vocal folds stuck in the paramedial or medial position, we will also plan to instruct her in use of upper airway aBductory maneuvers and laryngeal release techniques in order to facilitate sufficient breath flow as much as possible. She was also provided information today about personal amplification devices in order to aid her in being heard and participating more fully as the prolaryn gel reabsorbs.       A course of speech therapy is recommended to improve voice quality and promote reduced discomfort, effort and fatigue.    Return to see Dr. Wood around April 2023 when the filler should hopefully have reabsorbed. Will then re-evaluate in anticipation of surgery for vocal fold implant and will double-check scheduling of pre/post-surgical therapy.     She demonstrates a Good prognosis for improvement given adherence to therapeutic recommendations.     Positive indicators: positive response to therapy probes high level of comittment    Negative indicators: None    Research: n/a    DURATION / FREQUENCY:     2-3 bi-weekly virtual therapy sessions prior to possible implant.     Once a surgery date has been scheduled, we will schedule 1 pre-op therapy session approximately  1 week prior    Followed by 2 weekly and 3 biweekly therapy sessions starting approximately 2 weeks post-op.     Sessions should be scheduled in groups (2-3; 1+5) with either Joie Black CCC-SLP or Liyah Trinidad CCC-SLP    Goals:  Patient goal:    To understand the problem and fix it as much as possible     Short-term goal(s): Within the first 4 sessions, Clarice will:  -- demonstrate silent inhalation and abdominal breathing pattern in order to optimize breathing mechanics with 90% accy and min cues.  -- demonstrate relaxed throat breathing and laryngeal release in order to reduce upper airway constriction with 90% accy and min cues.  -- demonstrate kris-laryngeal release and laryngeal massage techniques with >80% accy  -- coordinate appropriate air flow levels with forward resonance during phonation in order to minimize laryngeal compensation and effort with 90% accy.    Long-term goal(s): In 3 months, Clarice will:  -- report a 90% resolution of voice and breathing symptoms during a week of performing typical personal, social, and professional activities.    This treatment plan was developed with the patient who agreed with the recommendations.    TOTAL SERVICE TIME: 60 minutes  EVALUATION OF VOICE AND RESONANCE (87350)  NO CHARGE FACILITY FEE (72962)    Cecy Clarke (voice), M.S., CCC-SLP  Speech-Language Pathologist  [facility]  [phone]  [email or web address]  Pronouns: she/her/hers      *this report was created in part through the use of computerized dictation software, and though reviewed following completion, some typographic errors may persist.  If there is confusion regarding any of this notes contents, please contact me for clarification

## 2023-01-02 NOTE — Clinical Note
1/2/2023       RE: Clarice Contreras  [address] [address]     Dear Colleague,    Thank you for referring your patient, Clarice Contreras, to the [facility] EAR NOSE AND [facility] [place] [facility]. Please see a copy of my visit note below.    Dear Colleague:    Clarice Contreras recently returned for follow-up at the [facility]. My clinic note from our visit is enclosed below.  Speech recognition software may have been used in the documentation below; input is reviewed before signature to the best of my ability.     I appreciate the ongoing opportunity to participate in this patient's care.    Please feel free to contact me with any questions.    Sincerely yours,      Serena Wood M.D., M.P.H.  , Laryngology  Director, [facility]  Otolaryngology- Head & Neck Surgery  [phone]            =====  HISTORY OF PRESENT ILLNESS:  Clarice Contreras is a pleasant 37-year-old female with right true vocal fold with intermittently decreased motion and a severely atrophic left true vocal fold with brisk motion but limited adduction, leading to large phonatory gap.    Given the unusual configuration and bilateral findings we completed a CT scan of the neck with contrast and MRI scan of the brain with contrast in August 2022 and both were unremarkable.    She is status post flexible fiberoptic transnasal laryngoscopy with percutaneous transcervical Prolaryn Gel injection to left true vocal fold on 12/1/22.    Today's updates:  1) Her voice felt 70% back to normal for the first week.  2) She then got COVID. During that time, it seemed like her voice was even better (85-90%) for a few days. She did have some coughing during that time.  3) Then she was back to 70% for three weeks.  4) She was feeling less short of breath now since the injection.  5) Her swallowing feels good,  "and aspiration is decreased.  6) Her \"high\" voice is easier with reduced effort. She uses this voice mostly.  7) Her \"low\" voice is ventricular.  8) Her voice is now back to about 50% of normal, which is still better than the 25% she was at prior to injection.      MEDICATIONS:     Current Outpatient Medications   Medication Sig Dispense Refill     PARoxetine (PAXIL) 20 MG tablet Take 20 mg by mouth daily         ALLERGIES:  No Known Allergies    NEW PMH/PSH: None    REVIEW OF SYSTEMS:  The patient completed a comprehensive 11 point review of systems (below), which was reviewed. Positives are as noted below.  Patient Supplied Answers to Review of Systems   ENT ROS 12/28/2022   Ears, Nose, Throat: Hoarseness          PHYSICAL EXAM:  General: The patient was alert and conversant, and in no acute distress.    Oral cavity/oropharynx: No masses or lesions. Dentition unchanged since prior. Tongue mobility and palate elevation intact and symmetric.  Neck: No palpable cervical lymphadenopathy, no significant tenderness to palpation of the thyrohyoid space, which was narrow. No obvious thyroid abnormality.  Resp: Breathing comfortably, no stridor or stertor.  Neuro: Symmetric facial function. Other cranial nerve function as documented above.  Psych: Normal affect, pleasant and cooperative.  Voice/speech: Moderate to severe dysphonia characterized by breathiness, roughness, strain and pitch instability.      Intake scores  Last 2 Scores for Patient-Answered VHI Questionnaire  VHI Total Score 1/1/2023   VHI Total Score 26      Last 2 Scores for Patient-Answered EAT Questionnaire  No flowsheet data found.     Last 2 Scores for Patient-Answered CSI Questionnaire  No flowsheet data found.        Procedure:   Flexible fiberoptic laryngoscopy and laryngovideostroboscopy  Indications: This procedure was warranted to evaluate the patient's laryngeal anatomy and function. Risks, benefits, and alternatives were " discussed.  Description: After written informed consent was obtained, a time-out was performed to confirm patient identity, procedure, and procedure site. Topical 3% lidocaine with 0.25% phenylephrine was applied to the nasal cavities. I performed the endoscopy and no complications were apparent. Continuous and stroboscopic light were utilized to assess routine phonation and variable frequency phonation.  Performed by: Serena Wood MD MPH  SLP: CHADD Clarke, MS, CCC-SLP   Findings: Normal nasopharynx. Normal base of tongue, valleculae, and epiglottis. Vocal fold mobility: right: impaired; left: full and brisk abduction, limited adduction. Medial edges of the vocal folds: smooth and straight on the right; left with bowed atrophic appearance; some injectate still apparent in inferior aspect of vocal fold. No focal mucosal lesions were observed on the true vocal folds. Glissade produced appropriate elongation. There was moderate supraglottic recruitment with connected speech. Mucosa of false vocal folds, aryepiglottic folds, piriform sinuses, and posterior glottis unremarkable. Airway was patent.   No lesions on NBI.    The addition of stroboscopy allowed evaluation of the mucosal wave.   Amplitude: right: normal; left: normal. Symmetry: intermittent symmetry. Closure pattern: incomplete. Closure plane: at glottic level. Phase distribution: open-phase predominant.                                IMPRESSION AND PLAN:   Clarice oCntreras returns reporting substantial improvement in response to trial vocal fold augmentation. We discussed options for next steps and various advantages and disadvantages of them. We noted that there are a variety of materials that can be used for longer term medialization. We acknowledged that one challenge is the limited motion on the right, which remains unexplained, and the fact that we do not know if it might progress. This issue may make it safer to use a removable material for  augmentation. I am currently leaning towards Silastic implantation for the specific control of contour and option of removal, although it does have some viscoelastic limitations compared to some of the other options. We can discuss this more further over time.    For now, we planned for her to follow up in April by which time we anticipate the injection material to be fully resorbed. In the meantime, she will do some speech therapy with CHADD Clarke, MS, CCC-SLP to optimize voice efficiency, which is both helpful now and for potential intra-operative voicing if we proceed to more permanent medialization.    She was comfortable with this plan and will let me know if questions or concerns arise in the meantime. I appreciate the opportunity to participate in the care of this pleasant patient.     Today's visit required additional screening time, PPE, and cleaning measures to allow for a safe in-person visit, due to the public health emergency. I spent a total of 37 minutes on 1/2/2023 in chart review, review of tests, patient visit, documentation, care coordination, and/or discussion with other providers about the issues documented above, separate from any documented procedure(s).      Dear Colleague:    Clarice Contreras recently returned for follow-up at the [facility]. My clinic note from our visit is enclosed below.  Speech recognition software may have been used in the documentation below; input is reviewed before signature to the best of my ability.     I appreciate the ongoing opportunity to participate in this patient's care.    Please feel free to contact me with any questions.    Sincerely yours,      Serena Wood M.D., M.P.H.  , Laryngology  Director, [facility]  Otolaryngology- Head & Neck Surgery  [phone]            =====  HISTORY OF PRESENT ILLNESS:  Clarice Contreras is a pleasant 37-year-old female with right true vocal fold with  "intermittently decreased motion and a severely atrophic left true vocal fold with brisk motion but limited adduction, leading to large phonatory gap.    Given the unusual configuration and bilateral findings we completed a CT scan of the neck with contrast and MRI scan of the brain with contrast in August 2022 and both were unremarkable.    She is status post flexible fiberoptic transnasal laryngoscopy with percutaneous transcervical Prolaryn Gel injection to left true vocal fold on 12/1/22.    Today's updates:  1) Her voice felt 70% back to normal for the first week.  2) She then got COVID. During that time, it seemed like her voice was even better (85-90%) for a few days. She did have some coughing during that time.  3) Then she was back to 70% for three weeks.  4) She was feeling less short of breath now since the injection.  5) Her swallowing feels good, and aspiration is decreased.  6) Her \"high\" voice is easier with reduced effort. She uses this voice mostly.  7) Her \"low\" voice is ventricular.  8) Her voice is now back to about 50% of normal, which is still better than the 25% she was at prior to injection.      MEDICATIONS:     Current Outpatient Medications   Medication Sig Dispense Refill     PARoxetine (PAXIL) 20 MG tablet Take 20 mg by mouth daily         ALLERGIES:  No Known Allergies    NEW PMH/PSH: None    REVIEW OF SYSTEMS:  The patient completed a comprehensive 11 point review of systems (below), which was reviewed. Positives are as noted below.  Patient Supplied Answers to Review of Systems   ENT ROS 12/28/2022   Ears, Nose, Throat: Hoarseness          PHYSICAL EXAM:  General: The patient was alert and conversant, and in no acute distress.    Oral cavity/oropharynx: No masses or lesions. Dentition unchanged since prior. Tongue mobility and palate elevation intact and symmetric.  Neck: No palpable cervical lymphadenopathy, no significant tenderness to palpation of the thyrohyoid space, which was " narrow. No obvious thyroid abnormality.  Resp: Breathing comfortably, no stridor or stertor.  Neuro: Symmetric facial function. Other cranial nerve function as documented above.  Psych: Normal affect, pleasant and cooperative.  Voice/speech: Moderate to severe dysphonia characterized by breathiness, roughness, strain and pitch instability.      Procedure:   Flexible fiberoptic laryngoscopy and laryngovideostroboscopy  Indications: This procedure was warranted to evaluate the patient's laryngeal anatomy and function. Risks, benefits, and alternatives were discussed.  Description: After written informed consent was obtained, a time-out was performed to confirm patient identity, procedure, and procedure site. Topical 3% lidocaine with 0.25% phenylephrine was applied to the nasal cavities. I performed the endoscopy and no complications were apparent. Continuous and stroboscopic light were utilized to assess routine phonation and variable frequency phonation.  Performed by: Serena Wood MD MPH  SLP: CHADD Clarke, MS, CCC-SLP   Findings: Normal nasopharynx. Normal base of tongue, valleculae, and epiglottis. Vocal fold mobility: right: impaired; left: full and brisk abduction, limited adduction. Medial edges of the vocal folds: smooth and straight on the right; left with bowed atrophic appearance; some injectate still apparent in inferior aspect of vocal fold. No focal mucosal lesions were observed on the true vocal folds. Glissade produced appropriate elongation. There was moderate supraglottic recruitment with connected speech. Mucosa of false vocal folds, aryepiglottic folds, piriform sinuses, and posterior glottis unremarkable. Airway was patent.   No lesions on NBI.    The addition of stroboscopy allowed evaluation of the mucosal wave.   Amplitude: right: normal; left: normal. Symmetry: intermittent symmetry. Closure pattern: incomplete. Closure plane: at glottic level. Phase distribution: open-phase  predominant.                                IMPRESSION AND PLAN:   Clarice Contreras returns reporting substantial improvement in response to trial vocal fold augmentation. We discussed options for next steps and various advantages and disadvantages of them. We noted that there are a variety of materials that can be used for longer term medialization. We acknowledged that one challenge is the limited motion on the right, which remains unexplained, and the fact that we do not know if it might progress. This issue may make it safer to use a removable material for augmentation. I am currently leaning towards Silastic implantation for more specific control of contour and option of removal, although it does have some viscoelastic limitations compared to some of the other options. We can discuss this more further over time.    For now, we planned for her to follow up in April by which time we anticipate the recent injection material to be fully resorbed. In the meantime, she will do some speech therapy with CHADD Clarke, MS, CCC-SLP to optimize voice efficiency, which is both helpful now and for potential intra-operative voicing if we proceed to more permanent medialization.    She was comfortable with this plan and will let me know if questions or concerns arise in the meantime. I appreciate the opportunity to participate in the care of this pleasant patient.     Today's visit required additional screening time, PPE, and cleaning measures to allow for a safe in-person visit, due to the public health emergency. I spent a total of 37 minutes on 1/2/2023 in chart review, review of tests, patient visit, documentation, care coordination, and/or discussion with other providers about the issues documented above, separate from any documented procedure(s).        Again, thank you for allowing me to participate in the care of your patient.      Sincerely,    Serena Wood MD

## 2023-01-02 NOTE — PATIENT INSTRUCTIONS
1.  You were seen in the ENT Clinic today by . If you have any questions or concerns after your appointment, please call [phone]. Press option #1 for scheduling related needs. Press option #3 for Nurse advice.    2.   has recommended the following:   -     3.  Plan is to return to clinic       Rayna Vyas LPN  [phone]  [facility] - Otolaryngology

## 2023-01-03 ENCOUNTER — TELEPHONE (OUTPATIENT)
Dept: OTOLARYNGOLOGY | Facility: CLINIC | Age: 38
End: 2023-01-03

## 2023-01-03 NOTE — TELEPHONE ENCOUNTER
Schedule patient as RETURN THROAT in April with Dr. Wood. As well as 3 bi-weekly RETURN SLP VOICE appointments with Liyah Trinidad, followed 1 appointment a month later, virtual or in person

## 2023-01-10 NOTE — PATIENT INSTRUCTIONS
"Liban Oneil,     It was a pleasure meeting you last week in clinic with Dr. Wood. Below is the information we discussed about an amplifier to help you be more easily heard and to participate more without needing to strain while you wait for the next step.     You can obtain a personal amplifier by going on [employer] and searching for the terms \"personal voice amplifier teacher\" and add \"wireless\" if that's what you want. Many of these options will come with a wired over-the-ear microphone, but you can typically order a separate lapel neda that would clip on your shirt for about $10. You can also find amplifiers that are wireless and use Bluetooth, but it depends on how you're planning to use it. I personally might opt for a wired version so I don't have to deal with issues connecting it and would just run the cord under my shirt to the speaker on my belt, but you may be more tech saavy than I am.    Some decent brands to get you started looking at the right thing are [employer], [employer], and Shidu. There are different weights, sizes, volume levels, etc so look over the specifications and reviews to find one you think meets your criteria.     Because Joie and I won't be notified when you end up scheduling with Dr. Wood for the implant, here are the specific instructions I put into my note for scheduling moving forward. Feel free to call the scheduling number [phone] to see if there are earlier cancellations than you're currently scheduled to start with Joie on 2/23/23. Since you're already scheduled with her for the first set, I would try to get in earlier on her schedule. However, you could schedule the post-op with me if her schedule doesn't work for you at that time.  DURATION / FREQUENCY:   2-3 bi-weekly virtual therapy sessions prior to possible implant.   Once a surgery date has been scheduled, we will schedule 1 pre-op therapy session approximately 1 week prior  Followed by 2 weekly and 3 biweekly " therapy sessions starting approximately 2 weeks post-op.   Sessions should be scheduled in groups (2-3; 1+5) with either Joie Black, CCC-SLP or Liyah Trinidad CCC-SLP    Please let me know if you have any questions.  Sincerely,  Katiuska Castelan. (voice), M.S., CCC-SLP  Speech-Language Pathologist  [facility]  [phone]  [email or web address]  Pronouns: she/her/hers

## 2023-02-23 ENCOUNTER — VIRTUAL VISIT (OUTPATIENT)
Dept: OTOLARYNGOLOGY | Facility: CLINIC | Age: 38
End: 2023-02-23
Payer: COMMERCIAL

## 2023-02-23 DIAGNOSIS — J38.3 VOCAL FOLD ATROPHY: ICD-10-CM

## 2023-02-23 DIAGNOSIS — J38.7 LARYNGEAL HYPERFUNCTION: ICD-10-CM

## 2023-02-23 DIAGNOSIS — J38.02 VOCAL FOLD PARESIS, BILATERAL: ICD-10-CM

## 2023-02-23 DIAGNOSIS — R49.0 DYSPHONIA: Primary | ICD-10-CM

## 2023-02-23 PROCEDURE — 92507 TX SP LANG VOICE COMM INDIV: CPT | Mod: GN | Performed by: SPEECH-LANGUAGE PATHOLOGIST

## 2023-02-23 NOTE — PROGRESS NOTES
[facility] VOICE CLINIC  VOICE / UPPER AIRWAY TREATMENT NOTE (CPT 05050)      Patient's name: Clarice Contreras  Date of Session: 2/23/2023  Providing SLP: Joie Black MS CCC-SLP  Referring Provider: Serena Wood MD MPH  Insurance Coverage: RF Code  Total # of SLP Visits: 3  # of SLP Therapy Sessions: 1  Session Location: Clarice was seen via telehealth today.     The patient has been notified and verbally consented to the following statements:     This video visit will be conducted between you and your provider.    Patient has opted to conduct today's video visit vs an in-person appointment, and is not able to attend due to possible exposure to COVID-19.      If during the course of the call the provider feels a video visit is not appropriate, you will not be charged for this service.     Provider has received verbal consent for billable virtual visit from the patient? Yes  Preferred method for receiving information: [employer]  Call initiated at: 2:59 PM  Call ended at: 3:59 PM  Platform used to conduct today's virtual appointment: [employer] Video  Location of provider: Residence   Location of patient: Residence       Impressions from initial evaluation on 8/22/22 by Joie Black MS CCC-SLP:   Clarice presents today with dysphonia beginning about 1.5 years ago during allergy season. Perceptually, her voice is moderately rough, breathy, weak, and strained with persistent diplophonia in all voice situations. Under laryngoscopy, the L vocal fold is very thin with reduced pliability and adduction to midline as well as aperiodic vibration. The R vocal fold's movement is intermittently and mildly sluggish with increased hyperfunction on this side. Stimulability with various SOVT tasks was not helpful in improving Clarice's voice quality or laryngoscopy findings today. For these reasons, voice therapy is not recommended at this time until surgical intervention and imaging have happened with Dr. Wood's  guidance. Re-evaluation is recommended prior to beginning voice therapy at this time.    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 Unfortunately, there has not been a particular etiology discovered yet, despite neck CT and MRI brain scan in August 2022, which were both normal. She underwent a left vocal fold prolaryn gel injection by Dr. Wood on 12/2/22. Laryngoscopy revealed that the left vocal fold is atrophic and paretic in the paramedial position, while the right vocal fold demonstrates compensatory mod-severe supraglottic hyperfunction, but a large glottic gap remains. The right vocal fold function is also inconsistent and occasionally demonstrates some sluggish and reduced movement, but overall displays full ROM. She perceptually demonstrated severely breathy voice quality with moderate strain and mild-moderate inconsistent roughness, large pitch fluctuation between a lower than normal register (117Hz) and higher than normal register (370Hz), poor respiratory mechanics with excessive effort and air flow, and perilaryngeal hyperfunction. She did benefit from AP and lateral laryngeal palpation along with visual feedback of air flow using a streamer. Given the inconsistent nature of her right vocal fold movement and unknown etiology, Dr. Wood expressed some hesitation to do a permanent vocal fold implant, but will plan to reassess and decide on the next step once the current prolaryn gel has fully reabsorbed.      She would benefit from a course of speech therapy targeting reduced perilaryngeal hyperfunction, improved respiratory mechanics, and strategies to optimize her voice production while reduced strain and effort. Due to  concern of airway impairment should she undergo a permanent implant procedure and then potentially have worsening vocal fold function, possibly leading to one or both vocal folds stuck in the paramedial or medial position, we will also plan to instruct her in use of upper airway aBductory maneuvers and laryngeal release techniques in order to facilitate sufficient breath flow as much as possible. She was also provided information today about personal amplification devices in order to aid her in being heard and participating more fully as the prolaryn gel reabsorbs.      ? A course of speech therapy is recommended to improve voice quality and promote reduced discomfort, effort and fatigue.  ? Return to see Dr. Wood around April 2023 when the filler should hopefully have reabsorbed. Will then re-evaluate in anticipation of surgery for vocal fold implant and will double-check scheduling of pre/post-surgical therapy.   ? She demonstrates a Good prognosis for improvement given adherence to therapeutic recommendations.   - Positive indicators: positive response to therapy probes high level of comittment  - Negative indicators: None     SUBJECTIVE:  Clarice reports gradual worsening of her voice quality and function since her last post-op appointment with Dr. Wood. At this time, she feels that the injection lasted for about 5 weeks and has been gradually declining, as if around 10% of the filler remains today. Consequently, she was very consistently diplophonic today with short utterance lengths. She notes that her volume is pretty good at home; however, it continues to be very difficult to communicate at work, although coworkers and patients' families have been understanding. With sterilization and the busy environment of work, using a personal amplification system is nearly impossible. She continues to deny issues with breathing, swallowing, and coughing/throat clearing.      OBJECTIVE/ASSESSMENT:    Patient Supplied  "Answers To Last 2 VHI Questionnaires  Voice Handicap Index (VHI-10) 1/1/2023 2/22/2023   My voice makes it difficult for people to hear me 3 3   People have difficulty understanding me in a noisy room 3 4   My voice difficulties restrict my personal and social life.  2 2   I feel left out of conversations because of my voice 2 2   My voice problem causes me to lose income 2 0   I feel as though I have to strain to produce voice 3 3   The clarity of my voice is unpredictable 3 3   My voice problem upsets me 2 2   My voice makes me feel handicapped 2 2   People ask, \"What's wrong with your voice?\" 4 4   VHI-10 26 25       THERAPEUTIC ACTIVITIES:    Semi-Occluded Vocal Tract Exercises (SOVTs) are a series of exercises aimed to recoordinate respiration, phonation, and resonance to produce an effortless, clear voice. Such exercises include straw phonation with or without water resistance, lip trills, humming/Basic Training Gesture for Resonant Voice Therapy, and transoral lip buzz/Basic Training Gesture for Vocal Function Exercises at comfortable speaking pitches and glissando tasks. These exercises were instructed today, and Clarice performed them with 60% accuracy with maximal clinician cueing and modeling. When using decreased effort and airflow, she was able to demonstrate a clearer voice quality with gentle forward-focused vibrations and no diplophonia; however, her volume was significantly reduced. We also focused on reducing upper body tension and movement during inhalation via tactile biofeedback of the diaphragm. We discussed that it is helpful that she is able to reduce her effort and achieve a clearer voice, although it is very understandable how communicating in this way at work would be very difficult. Clarice will practice this reduced effort voice use between sessions and while at home.    Rescue breathing techniques help achieve maximal glottic opening during inspiration and maintenance of the airway " "during expiration during episodes of dyspnea secondary to VCD/PVFM. Inhalation with pursed lips or three gentle sniffs is trained, and prolonged exhalation is performed with a high pressure, voiceless fricative such as \"sh.\" These exercises were instructed today, and Clarice performed them with high accuracy following clinician cueing and modeling. 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 Rescue breathing was instructed as a post-operative aid in case the begins experiencing airway obstruction, and she performed these with high accuracy. She presented with increased effort and diplophonia today in order to increase her volume to be heard in conversational speech; however, she did demonstrate a good ability to reduce this tension and achieve a clearer but significantly weaker voice quality. This is a positive prognostic indicator for her ability to reduce hyperfunction when her vocal folds are structurally able to achieve improved glottic closure, as it is very much a compensatory strategies simply to be heard in loud environments at this time.       PLAN:    Clarice will perform SOVT exercises 5 times daily for 2-3 minutes between sessions    Clarice will perform rescue breathing techniques twice daily to aid automatic response in case these techniques are needed in the future    Written instructions were provided to aid home programming via [employer]    Clarice continues to demonstrate adequate progress toward long- and short-term " goals.    Continued voice therapy services are recommended. Clarice will follow-up for additional sessions in 2 weeks. Current goals will continue to be addressed.    Clarice is in agreement with this plan of care.      SLP PLAN:  Voice SLP presence at next appointment with laryngologist (e.g. Dr. Powell, Dr. Wood, Dr. Skinner) is needed to aid with pre- and post-op planning. Next scheduled MD visit is scheduled on 4/10/23.      BILLING SUMMARY:    Total treatment time: 60 minutes    Speech Pathology Treatment (82114)    No charge facility fee      Joie Black MS CCC-SLP  Speech-Language Pathologist  [facility] - Department of Otolaryngology  [facility]  [email or web address]  [phone]    *This note may have been completed using [id number] dictation software, so errors may exist. Please contact me for clarification if needed*

## 2023-02-23 NOTE — LETTER
2/23/2023       RE: Clarice Contreras  [address] [address]     Dear Colleague,    Thank you for referring your patient, Clarice Contreras, to the [facility] VOICE CLINIC [place] [facility]. Please see a copy of my visit note below.    [facility] VOICE [facility]  VOICE / UPPER AIRWAY TREATMENT NOTE (CPT 69153)      Patient's name: Clarice Contreras  Date of Session: 2/23/2023  Providing SLP: Joie Black MS CCC-SLP  Referring Provider: Serena Wood MD MPH  Insurance Coverage: [employer]  Total # of SLP Visits: 3  # of SLP Therapy Sessions: 1  Session Location: Clarice was seen via telehealth today.     The patient has been notified and verbally consented to the following statements:     This video visit will be conducted between you and your provider.    Patient has opted to conduct today's video visit vs an in-person appointment, and is not able to attend due to possible exposure to COVID-19.      If during the course of the call the provider feels a video visit is not appropriate, you will not be charged for this service.     Provider has received verbal consent for billable virtual visit from the patient? Yes  Preferred method for receiving information: [employer]  Call initiated at: 2:59 PM  Call ended at: 3:59 PM  Platform used to conduct today's virtual appointment: [employer] Well Video  Location of provider: Residence   Location of patient: Residence       Impressions from initial evaluation on 8/22/22 by Joie Black MS CCC-SLP:   Clarice presents today with dysphonia beginning about 1.5 years ago during allergy season. Perceptually, her voice is moderately rough, breathy, weak, and strained with persistent diplophonia in all voice situations. Under laryngoscopy, the L vocal fold is very thin with reduced pliability and adduction to midline as well as aperiodic vibration. The R vocal fold's movement is intermittently and mildly  sluggish with increased hyperfunction on this side. Stimulability with various SOVT tasks was not helpful in improving Clarice's voice quality or laryngoscopy findings today. For these reasons, voice therapy is not recommended at this time until surgical intervention and imaging have happened with Dr. Wood's guidance. Re-evaluation is recommended prior to beginning voice therapy at this time.    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 Unfortunately, there has not been a particular etiology discovered yet, despite neck CT and MRI brain scan in August 2022, which were both normal. She underwent a left vocal fold prolaryn gel injection by Dr. Wood on 12/2/22. Laryngoscopy revealed that the left vocal fold is atrophic and paretic in the paramedial position, while the right vocal fold demonstrates compensatory mod-severe supraglottic hyperfunction, but a large glottic gap remains. The right vocal fold function is also inconsistent and occasionally demonstrates some sluggish and reduced movement, but overall displays full ROM. She perceptually demonstrated severely breathy voice quality with moderate strain and mild-moderate inconsistent roughness, large pitch fluctuation between a lower than normal register (117Hz) and higher than normal register (370Hz), poor respiratory mechanics with excessive effort and air flow, and perilaryngeal hyperfunction. She did benefit from AP and lateral laryngeal palpation along with visual feedback of air flow using a streamer. Given the inconsistent nature of her right vocal fold movement and unknown etiology, Dr. Wood expressed some hesitation to do a permanent vocal fold implant, but will plan to  reassess and decide on the next step once the current prolaryn gel has fully reabsorbed.      She would benefit from a course of speech therapy targeting reduced perilaryngeal hyperfunction, improved respiratory mechanics, and strategies to optimize her voice production while reduced strain and effort. Due to concern of airway impairment should she undergo a permanent implant procedure and then potentially have worsening vocal fold function, possibly leading to one or both vocal folds stuck in the paramedial or medial position, we will also plan to instruct her in use of upper airway aBductory maneuvers and laryngeal release techniques in order to facilitate sufficient breath flow as much as possible. She was also provided information today about personal amplification devices in order to aid her in being heard and participating more fully as the prolaryn gel reabsorbs.      ? A course of speech therapy is recommended to improve voice quality and promote reduced discomfort, effort and fatigue.  ? Return to see Dr. Wood around April 2023 when the filler should hopefully have reabsorbed. Will then re-evaluate in anticipation of surgery for vocal fold implant and will double-check scheduling of pre/post-surgical therapy.   ? She demonstrates a Good prognosis for improvement given adherence to therapeutic recommendations.   - Positive indicators: positive response to therapy probes high level of comittment  - Negative indicators: None     SUBJECTIVE:  Clarice reports gradual worsening of her voice quality and function since her last post-op appointment with Dr. Wood. At this time, she feels that the injection lasted for about 5 weeks and has been gradually declining, as if around 10% of the filler remains today. Consequently, she was very consistently diplophonic today with short utterance lengths. She notes that her volume is pretty good at home; however, it continues to be very difficult to communicate at work,  "although coworkers and patients' families have been understanding. With sterilization and the busy environment of work, using a personal amplification system is nearly impossible. She continues to deny issues with breathing, swallowing, and coughing/throat clearing.      OBJECTIVE/ASSESSMENT:    Patient Supplied Answers To Last 2 VHI Questionnaires  Voice Handicap Index (VHI-10) 1/1/2023 2/22/2023   My voice makes it difficult for people to hear me 3 3   People have difficulty understanding me in a noisy room 3 4   My voice difficulties restrict my personal and social life.  2 2   I feel left out of conversations because of my voice 2 2   My voice problem causes me to lose income 2 0   I feel as though I have to strain to produce voice 3 3   The clarity of my voice is unpredictable 3 3   My voice problem upsets me 2 2   My voice makes me feel handicapped 2 2   People ask, \"What's wrong with your voice?\" 4 4   VHI-10 26 25       THERAPEUTIC ACTIVITIES:    Semi-Occluded Vocal Tract Exercises (SOVTs) are a series of exercises aimed to recoordinate respiration, phonation, and resonance to produce an effortless, clear voice. Such exercises include straw phonation with or without water resistance, lip trills, humming/Basic Training Gesture for Resonant Voice Therapy, and transoral lip buzz/Basic Training Gesture for Vocal Function Exercises at comfortable speaking pitches and glissando tasks. These exercises were instructed today, and Clarice performed them with 60% accuracy with maximal clinician cueing and modeling. When using decreased effort and airflow, she was able to demonstrate a clearer voice quality with gentle forward-focused vibrations and no diplophonia; however, her volume was significantly reduced. We also focused on reducing upper body tension and movement during inhalation via tactile biofeedback of the diaphragm. We discussed that it is helpful that she is able to reduce her effort and achieve a clearer " "voice, although it is very understandable how communicating in this way at work would be very difficult. Clarice will practice this reduced effort voice use between sessions and while at home.    Rescue breathing techniques help achieve maximal glottic opening during inspiration and maintenance of the airway during expiration during episodes of dyspnea secondary to VCD/PVFM. Inhalation with pursed lips or three gentle sniffs is trained, and prolonged exhalation is performed with a high pressure, voiceless fricative such as \"sh.\" These exercises were instructed today, and Clarice performed them with high accuracy following clinician cueing and modeling. 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 Rescue breathing was instructed as a post-operative aid in case the begins experiencing airway obstruction, and she performed these with high accuracy. She presented with increased effort and diplophonia today in order to increase her volume to be heard in conversational speech; however, she did demonstrate a good ability to reduce this tension and achieve a clearer but significantly weaker voice quality. This is a positive prognostic indicator for her ability to reduce hyperfunction when her vocal folds are structurally able to achieve improved glottic closure, as it is very much a compensatory strategies simply to be heard in loud environments at this time.       PLAN:    Clarice will perform SOVT exercises 5 times daily for 2-3 minutes between " sessions    Clarice will perform rescue breathing techniques twice daily to aid automatic response in case these techniques are needed in the future    Written instructions were provided to aid home programming via [employer]    Clarice continues to demonstrate adequate progress toward long- and short-term goals.    Continued voice therapy services are recommended. Clarice will follow-up for additional sessions in 2 weeks. Current goals will continue to be addressed.    Clarice is in agreement with this plan of care.    SLP PLAN:  Voice SLP presence at next appointment with laryngologist (e.g. Dr. Powell, Dr. Wood, Dr. Skinner) is needed to aid with pre- and post-op planning. Next scheduled MD visit is scheduled on 4/10/23.    BILLING SUMMARY:    Total treatment time: 60 minutes    Speech Pathology Treatment (46758)    No charge facility fee      *This note may have been completed using [id number] dictation software, so errors may exist. Please contact me for clarification if needed*      Again, thank you for allowing me to participate in the care of your patient.      Sincerely,    Joie Black, SLP

## 2023-03-23 ENCOUNTER — OFFICE VISIT (OUTPATIENT)
Dept: OTOLARYNGOLOGY | Facility: CLINIC | Age: 38
End: 2023-03-23
Payer: COMMERCIAL

## 2023-03-23 DIAGNOSIS — J38.7 LARYNGEAL HYPERFUNCTION: ICD-10-CM

## 2023-03-23 DIAGNOSIS — R49.0 DYSPHONIA: Primary | ICD-10-CM

## 2023-03-23 DIAGNOSIS — J38.02 VOCAL FOLD PARESIS, BILATERAL: ICD-10-CM

## 2023-03-23 DIAGNOSIS — J38.3 VOCAL FOLD ATROPHY: ICD-10-CM

## 2023-03-23 PROCEDURE — 92507 TX SP LANG VOICE COMM INDIV: CPT | Mod: GN | Performed by: SPEECH-LANGUAGE PATHOLOGIST

## 2023-03-23 NOTE — LETTER
Date:March 24, 2023      Patient was self referred, no letter generated. Do not send.        [facility] Information

## 2023-03-23 NOTE — LETTER
3/23/2023       RE: Clarice Contreras  [address] [facility] [address]     Dear Colleague,    Thank you for referring your patient, Clarice Contreras, to the [facility]ICE [facility]IN[facility] [place] [facility]. Please see a copy of my visit note below.    [facility]  VOICE / UPPER AIRWAY TREATMENT NOTE (CPT [id number])      Patient's name: Clarice Contreras  Date of Session: 3/23/2023  Providing SLP: Joie Black MS CCC-SLP  Referring Provider: Serena Wood MD MPH  Insurance Coverage: [employer]  Total # of SLP Visits: 4  # of SLP Therapy Sessions: 2  Session Location: [facility]      Impressions from initial evaluation on 8/22/22 by Joie Black MS CCC-SLP:   Clarice presents today with dysphonia beginning about 1.5 years ago during allergy season. Perceptually, her voice is moderately rough, breathy, weak, and strained with persistent diplophonia in all voice situations. Under laryngoscopy, the L vocal fold is very thin with reduced pliability and adduction to midline as well as aperiodic vibration. The R vocal fold's movement is intermittently and mildly sluggish with increased hyperfunction on this side. Stimulability with various SOVT tasks was not helpful in improving Clarice's voice quality or laryngoscopy findings today. For these reasons, voice therapy is not recommended at this time until surgical intervention and imaging have happened with Dr. Wood's guidance. Re-evaluation is recommended prior to beginning voice therapy at this time.    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 Unfortunately, there has  not been a particular etiology discovered yet, despite neck CT and MRI brain scan in August 2022, which were both normal. She underwent a left vocal fold prolaryn gel injection by Dr. Wood on 12/2/22. Laryngoscopy revealed that the left vocal fold is atrophic and paretic in the paramedial position, while the right vocal fold demonstrates compensatory mod-severe supraglottic hyperfunction, but a large glottic gap remains. The right vocal fold function is also inconsistent and occasionally demonstrates some sluggish and reduced movement, but overall displays full ROM. She perceptually demonstrated severely breathy voice quality with moderate strain and mild-moderate inconsistent roughness, large pitch fluctuation between a lower than normal register (117Hz) and higher than normal register (370Hz), poor respiratory mechanics with excessive effort and air flow, and perilaryngeal hyperfunction. She did benefit from AP and lateral laryngeal palpation along with visual feedback of air flow using a streamer. Given the inconsistent nature of her right vocal fold movement and unknown etiology, Dr. Wood expressed some hesitation to do a permanent vocal fold implant, but will plan to reassess and decide on the next step once the current prolaryn gel has fully reabsorbed.      She would benefit from a course of speech therapy targeting reduced perilaryngeal hyperfunction, improved respiratory mechanics, and strategies to optimize her voice production while reduced strain and effort. Due to concern of airway impairment should she undergo a permanent implant procedure and then potentially have worsening vocal fold function, possibly leading to one or both vocal folds stuck in the paramedial or medial position, we will also plan to instruct her in use of upper airway aBductory maneuvers and laryngeal release techniques in order to facilitate sufficient breath flow as much as possible. She was also provided information today  "about personal amplification devices in order to aid her in being heard and participating more fully as the prolaryn gel reabsorbs.      ? A course of speech therapy is recommended to improve voice quality and promote reduced discomfort, effort and fatigue.  ? Return to see Dr. Wood around April 2023 when the filler should hopefully have reabsorbed. Will then re-evaluate in anticipation of surgery for vocal fold implant and will double-check scheduling of pre/post-surgical therapy.   ? She demonstrates a Good prognosis for improvement given adherence to therapeutic recommendations.   - Positive indicators: positive response to therapy probes high level of comittment  - Negative indicators: None       SUBJECTIVE:  Clarice reports continued gradual worsening of her voice quality. Her quieter voice, which was previously more consistently clear, has become more diplophonia since our most recent visit. Clarice reports that her speech utterances are shortened, as she has been running out of air more quickly. She is looking forward to being able to communicate better in the future, particularly in background noise.      OBJECTIVE/ASSESSMENT:    Patient Supplied Answers To Last 2 VHI Questionnaires  Voice Handicap Index (VHI-10) 2/22/2023 3/23/2023   My voice makes it difficult for people to hear me 3 3   People have difficulty understanding me in a noisy room 4 4   My voice difficulties restrict my personal and social life.  2 1   I feel left out of conversations because of my voice 2 3   My voice problem causes me to lose income 0 1   I feel as though I have to strain to produce voice 3 3   The clarity of my voice is unpredictable 3 3   My voice problem upsets me 2 2   My voice makes me feel handicapped 2 2   People ask, \"What's wrong with your voice?\" 4 4   VHI-10 25 26       THERAPEUTIC ACTIVITIES:  Rescue breathing techniques help achieve maximal glottic opening during inspiration and maintenance of the airway during " "expiration during episodes of dyspnea secondary to VCD/PVFM. Inhalation with pursed lips or three gentle sniffs is trained, and prolonged exhalation is performed with a high pressure, voiceless fricative such as \"sh.\" These exercises were reviewed today, and Clarice performed them with high independent accuracy following clinician cueing and modeling. Clarice denies breathing issues since her most recent session and feels confident in performing these PRN if she is experiencing dyspnea.    Perioperative voice care was provided in anticipation for a likely thyroplasty to aid glottic closure given Clarice's vocal fold paralysis. We discussed the general surgical events of the procedure, expectations for her voice immediately after the surgery and in the days following, possible negative consequences (e.g. wound healing of small neck incision, increased dyspnea), and post-operative vocal rest (N/A for thyroplasty). Clarice denied having further questions about the procedure and plans to bring any others to Dr. Wood when they see each other on 4/10/23.      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 We discussed perioperative care surrounding a thyroplasty and answered Clarice's questions. She will follow-up with Dr. Wood on 4/10/23 to further discuss the possibility of this procedure, answer additional questions, and address other pre-operative needs. No voice SLP services are warranted for this visit, as she has been fully optimized from a voice and airway perspective at this time. I plan to see her about 3 week post-op to check in with regard to her voice and breathing " and re-assess if further care is needed. Clarice is in agreement with this plan of care.       BILLING SUMMARY:    Total treatment time: 35 minutes    Speech Pathology Treatment (22930)      Joie Black MS CCC-SLP  Speech-Language Pathologist  [facility] - Department of Otolaryngology  [facility]  [email or web address]  [phone]    *This note may have been completed using [id number] dictation software, so errors may exist. Please contact me for clarification if needed*      Again, thank you for allowing me to participate in the care of your patient.      Sincerely,    Joie Black, SLP

## 2023-03-23 NOTE — PROGRESS NOTES
[facility] VOICE CLINIC  VOICE / UPPER AIRWAY TREATMENT NOTE (CPT 21394)      Patient's name: Clarice Contreras  Date of Session: 3/23/2023  Providing SLP: Joie Black MS CCC-SLP  Referring Provider: Serena Wood MD MPH  Insurance Coverage: [employer]  Total # of SLP Visits: 4  # of SLP Therapy Sessions: 2  Session Location: [facility]      Impressions from initial evaluation on 8/22/22 by Joie Black MS CCC-SLP:   Clarice presents today with dysphonia beginning about 1.5 years ago during allergy season. Perceptually, her voice is moderately rough, breathy, weak, and strained with persistent diplophonia in all voice situations. Under laryngoscopy, the L vocal fold is very thin with reduced pliability and adduction to midline as well as aperiodic vibration. The R vocal fold's movement is intermittently and mildly sluggish with increased hyperfunction on this side. Stimulability with various SOVT tasks was not helpful in improving Clarice's voice quality or laryngoscopy findings today. For these reasons, voice therapy is not recommended at this time until surgical intervention and imaging have happened with Dr. Wood's guidance. Re-evaluation is recommended prior to beginning voice therapy at this time.    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 Unfortunately, there has not been a particular etiology discovered yet, despite neck CT and MRI brain scan in August 2022, which were both normal. She underwent a left vocal fold prolaryn gel injection by Dr. Wood on 12/2/22. Laryngoscopy revealed that the left vocal fold is atrophic and paretic in the paramedial position, while the right  vocal fold demonstrates compensatory mod-severe supraglottic hyperfunction, but a large glottic gap remains. The right vocal fold function is also inconsistent and occasionally demonstrates some sluggish and reduced movement, but overall displays full ROM. She perceptually demonstrated severely breathy voice quality with moderate strain and mild-moderate inconsistent roughness, large pitch fluctuation between a lower than normal register (117Hz) and higher than normal register (370Hz), poor respiratory mechanics with excessive effort and air flow, and perilaryngeal hyperfunction. She did benefit from AP and lateral laryngeal palpation along with visual feedback of air flow using a streamer. Given the inconsistent nature of her right vocal fold movement and unknown etiology, Dr. Wood expressed some hesitation to do a permanent vocal fold implant, but will plan to reassess and decide on the next step once the current prolaryn gel has fully reabsorbed.      She would benefit from a course of speech therapy targeting reduced perilaryngeal hyperfunction, improved respiratory mechanics, and strategies to optimize her voice production while reduced strain and effort. Due to concern of airway impairment should she undergo a permanent implant procedure and then potentially have worsening vocal fold function, possibly leading to one or both vocal folds stuck in the paramedial or medial position, we will also plan to instruct her in use of upper airway aBductory maneuvers and laryngeal release techniques in order to facilitate sufficient breath flow as much as possible. She was also provided information today about personal amplification devices in order to aid her in being heard and participating more fully as the prolaryn gel reabsorbs.      ? A course of speech therapy is recommended to improve voice quality and promote reduced discomfort, effort and fatigue.  ? Return to see Dr. Wood around April 2023 when the filler  "should hopefully have reabsorbed. Will then re-evaluate in anticipation of surgery for vocal fold implant and will double-check scheduling of pre/post-surgical therapy.   ? She demonstrates a Good prognosis for improvement given adherence to therapeutic recommendations.   - Positive indicators: positive response to therapy probes high level of comittment  - Negative indicators: None       SUBJECTIVE:  Clarice reports continued gradual worsening of her voice quality. Her quieter voice, which was previously more consistently clear, has become more diplophonia since our most recent visit. Clarice reports that her speech utterances are shortened, as she has been running out of air more quickly. She is looking forward to being able to communicate better in the future, particularly in background noise.      OBJECTIVE/ASSESSMENT:    Patient Supplied Answers To Last 2 VHI Questionnaires  Voice Handicap Index (VHI-10) 2/22/2023 3/23/2023   My voice makes it difficult for people to hear me 3 3   People have difficulty understanding me in a noisy room 4 4   My voice difficulties restrict my personal and social life.  2 1   I feel left out of conversations because of my voice 2 3   My voice problem causes me to lose income 0 1   I feel as though I have to strain to produce voice 3 3   The clarity of my voice is unpredictable 3 3   My voice problem upsets me 2 2   My voice makes me feel handicapped 2 2   People ask, \"What's wrong with your voice?\" 4 4   VHI-10 25 26       THERAPEUTIC ACTIVITIES:  Rescue breathing techniques help achieve maximal glottic opening during inspiration and maintenance of the airway during expiration during episodes of dyspnea secondary to VCD/PVFM. Inhalation with pursed lips or three gentle sniffs is trained, and prolonged exhalation is performed with a high pressure, voiceless fricative such as \"sh.\" These exercises were reviewed today, and Clarice performed them with high independent accuracy " following clinician cueing and modeling. Clarice denies breathing issues since her most recent session and feels confident in performing these PRN if she is experiencing dyspnea.    Perioperative voice care was provided in anticipation for a likely thyroplasty to aid glottic closure given Clarice's vocal fold paralysis. We discussed the general surgical events of the procedure, expectations for her voice immediately after the surgery and in the days following, possible negative consequences (e.g. wound healing of small neck incision, increased dyspnea), and post-operative vocal rest (N/A for thyroplasty). Clarice denied having further questions about the procedure and plans to bring any others to Dr. Wood when they see each other on 4/10/23.      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 We discussed perioperative care surrounding a thyroplasty and answered Clarice's questions. She will follow-up with Dr. Wood on 4/10/23 to further discuss the possibility of this procedure, answer additional questions, and address other pre-operative needs. No voice SLP services are warranted for this visit, as she has been fully optimized from a voice and airway perspective at this time. I plan to see her about 3 week post-op to check in with regard to her voice and breathing and re-assess if further care is needed. Clarice is in agreement with this plan of care.       BILLING SUMMARY:    Total treatment time: 35 minutes    Speech Pathology Treatment (31294)      Joie Black MS CCC-SLP  Speech-Language Pathologist  [facility] - Department of Otolaryngology  [facility]  Carmella [email or web address]  [phone]    *This note may have been completed using [id number] dictation software, so errors may exist. Please contact me for clarification if needed*

## 2023-04-10 ENCOUNTER — OFFICE VISIT (OUTPATIENT)
Dept: OTOLARYNGOLOGY | Facility: CLINIC | Age: 38
End: 2023-04-10
Payer: COMMERCIAL

## 2023-04-10 VITALS
SYSTOLIC BLOOD PRESSURE: 123 MMHG | OXYGEN SATURATION: 100 % | BODY MASS INDEX: 29.47 KG/M2 | WEIGHT: 187.8 LBS | DIASTOLIC BLOOD PRESSURE: 85 MMHG | HEIGHT: 67 IN | HEART RATE: 58 BPM

## 2023-04-10 DIAGNOSIS — J38.01 UNILATERAL PARTIAL VOCAL CORD PARALYSIS: ICD-10-CM

## 2023-04-10 DIAGNOSIS — J38.3 VOCAL FOLD ATROPHY: ICD-10-CM

## 2023-04-10 DIAGNOSIS — J38.3 GLOTTIC INSUFFICIENCY: ICD-10-CM

## 2023-04-10 DIAGNOSIS — R49.0 DYSPHONIA: Primary | ICD-10-CM

## 2023-04-10 PROCEDURE — 31579 LARYNGOSCOPY TELESCOPIC: CPT | Performed by: OTOLARYNGOLOGY

## 2023-04-10 PROCEDURE — 99214 OFFICE O/P EST MOD 30 MIN: CPT | Mod: 25 | Performed by: OTOLARYNGOLOGY

## 2023-04-10 ASSESSMENT — PAIN SCALES - GENERAL: PAINLEVEL: NO PAIN (0)

## 2023-04-10 NOTE — PROGRESS NOTES
"Dear Colleague:    Clarice Contreras recently returned for follow-up at the [facility]. My clinic note from our visit is enclosed below.  Speech recognition software may have been used in the documentation below; input is reviewed before signature to the best of my ability.     I appreciate the ongoing opportunity to participate in this patient's care.    Please feel free to contact me with any questions.    Sincerely yours,      Serena Wood M.D., M.P.H.  , Laryngology  Director, [facility]  Otolaryngology- Head & Neck Surgery  [phone]          =====  HISTORY OF PRESENT ILLNESS:  Clarice Contreras is a pleasant 38-year-old female with a history of right true vocal fold with intermittently decreased motion and a severely atrophic left true vocal fold with brisk motion but limited adduction, leading to large phonatory gap.    Given the unusual configuration and bilateral findings we completed a CT scan of the neck with contrast and MRI scan of the brain with contrast in August 2022 and both were unremarkable.    She is status post flexible fiberoptic transnasal laryngoscopy with percutaneous transcervical Prolaryn Gel injection to left true vocal fold on 12/1/22.    With that she noted:  1) Her voice felt 70% back to normal for the first week.  2) She then got COVID. During that time, it seemed like her voice was even better (85-90%) for a few days. She did have some coughing during that time.  3) Then she was back to 70% for three weeks.  4) She was feeling less short of breath after the injection.  5) Her swallowing feels good, and aspiration is decreased.  6) Her \"high\" voice is easier with reduced effort. She uses this voice mostly.  7) Her \"low\" voice is ventricular.  8) Her voice is now back to about 50% of normal, which is still better than the 25% she was at prior to injection.    Today's updates:  - She feels like her voice is back to " her pre-injection baseline.  - She wishes she could sound less dysphonic just so others would not ask about it so much. She is less worried about voice quality.  - There are no new neurological concerns.  - Her swallowing is fine and her breathing is fine.  - There are no autoimmune concerns.      MEDICATIONS:     Current Outpatient Medications   Medication Sig Dispense Refill     PARoxetine (PAXIL) 20 MG tablet Take 20 mg by mouth daily         ALLERGIES:  No Known Allergies    NEW PMH/PSH: None    REVIEW OF SYSTEMS:  The patient completed a comprehensive 11 point review of systems (below), which was reviewed. Positives are as noted below.  Patient Supplied Answers to Review of Systems      4/5/2023    10:24 AM   [facility] ROS   Ears, Nose, Throat Hoarseness       PHYSICAL EXAM:  General: The patient was alert and conversant, and in no acute distress.    Neck: No palpable cervical lymphadenopathy, no  tenderness to palpation of the thyrohyoid space. No obvious thyroid abnormality.  Resp: Breathing comfortably, no stridor or stertor.  Neuro: Symmetric facial function. Other cranial nerve function as documented above.  Psych: Normal affect, pleasant and cooperative.  Voice/speech: Severe dysphonia characterized by breathiness, roughness, strain and diplophonia.      Intake scores  Last 2 Scores for Patient-Answered VHI Questionnaire      3/23/2023     9:27 AM 4/5/2023    10:26 AM   VHI Total Score   VHI Total Score 26 25       Procedure:   Flexible fiberoptic laryngoscopy and laryngovideostroboscopy  Indications: This procedure was warranted to evaluate the patient's laryngeal anatomy and function. Risks, benefits, and alternatives were discussed.  Description: After written informed consent was obtained, a time-out was performed to confirm patient identity, procedure, and procedure site. Topical 3% lidocaine with 0.25% phenylephrine was applied to the nasal cavities. I performed the endoscopy and no complications were  apparent. Continuous and stroboscopic light were utilized to assess routine phonation and variable frequency phonation.  Performed by: Serena Wood MD MPH  SLP: Santosh Rainey, PhD, CCC-SLP   Findings: Normal nasopharynx. Normal base of tongue, valleculae, and epiglottis. Vocal fold mobility: right: normal; left: brisk abduction, limited adduction. Medial edges of the true vocal folds: smooth and straight. No focal mucosal lesions were observed on the true vocal folds. Glissade produced appropriate elongation. There was mild to moderate supraglottic recruitment with connected speech. Mucosa of false vocal folds, aryepiglottic folds, piriform sinuses, and posterior glottis unremarkable. Airway was patent.        The addition of stroboscopy allowed evaluation of the mucosal wave. This was limited due to the glottic gap.  Amplitude: right: normal; left: appeared moderately decreased, but this may be related to poor entrainment. Symmetry: associated asymmetry. Closure pattern: incomplete. Closure plane: at glottic level. Phase distribution: open-phase predominant.                  IMPRESSION AND PLAN:   Clarice Contreras returns with presumed resorption of the injection material. She continues to have an unusual configuration of brisk, but limited unilateral vocal fold motion and a large glottic gap. Fortunately, the contralateral paresis appears to have improved.    Given her current presentation, we discussed consideration of medialization with a reversible and permanent approach given her prior history of contralateral vocal fold paresis as well as the unusual neurologic function of the left side. She is aware that we unfortunately do not anticipate that we will be able to help her reach a normal voice, and ablation of left vocal fold motion would not be advised even though it could help narrow the glottic gap, because of the risk of airway compromise if her right vocal fold were to become paretic again. She  is comfortable with all of this and states that mostly she is just eager to have a little more projection. She is less concerned about her voice quality.    We will likely plan to schedule a thyroplasty in May. She is amenable to this. I appreciate the opportunity to participate in the care of this pleasant patient.     Today's visit required additional screening time, PPE, and cleaning measures to allow for a safe in-person visit, due to the public health emergency. I spent a total of 35 minutes on 4/10/2023 in chart review, review of tests, patient visit, documentation, care coordination, and/or discussion with other providers about the issues documented above, separate from any documented procedure(s).

## 2023-04-10 NOTE — LETTER
"4/10/2023      RE: Clarice Contreras  [address] [facility] [address]       Dear Colleague:    Clarice Contreras recently returned for follow-up at the [facility]. My clinic note from our visit is enclosed below.  Speech recognition software may have been used in the documentation below; input is reviewed before signature to the best of my ability.     I appreciate the ongoing opportunity to participate in this patient's care.    Please feel free to contact me with any questions.    Sincerely yours,      Serena Wood M.D., M.P.H.  , Laryngology  Director, [facility]  Otolaryngology- Head & Neck Surgery  [phone]          =====  HISTORY OF PRESENT ILLNESS:  Clarice Contreras is a pleasant 38-year-old female with a history of right true vocal fold with intermittently decreased motion and a severely atrophic left true vocal fold with brisk motion but limited adduction, leading to large phonatory gap.    Given the unusual configuration and bilateral findings we completed a CT scan of the neck with contrast and MRI scan of the brain with contrast in August 2022 and both were unremarkable.    She is status post flexible fiberoptic transnasal laryngoscopy with percutaneous transcervical Prolaryn Gel injection to left true vocal fold on 12/1/22.    With that she noted:  1) Her voice felt 70% back to normal for the first week.  2) She then got COVID. During that time, it seemed like her voice was even better (85-90%) for a few days. She did have some coughing during that time.  3) Then she was back to 70% for three weeks.  4) She was feeling less short of breath after the injection.  5) Her swallowing feels good, and aspiration is decreased.  6) Her \"high\" voice is easier with reduced effort. She uses this voice mostly.  7) Her \"low\" voice is ventricular.  8) Her voice is now back to about 50% of normal, which is still better than the 25% she " was at prior to injection.    Today's updates:  - She feels like her voice is back to her pre-injection baseline.  - She wishes she could sound less dysphonic just so others would not ask about it so much. She is less worried about voice quality.  - There are no new neurological concerns.  - Her swallowing is fine and her breathing is fine.  - There are no autoimmune concerns.      MEDICATIONS:     Current Outpatient Medications   Medication Sig Dispense Refill     PARoxetine (PAXIL) 20 MG tablet Take 20 mg by mouth daily         ALLERGIES:  No Known Allergies    NEW PMH/PSH: None    REVIEW OF SYSTEMS:  The patient completed a comprehensive 11 point review of systems (below), which was reviewed. Positives are as noted below.  Patient Supplied Answers to Review of Systems      4/5/2023    10:24 AM    ENT ROS   Ears, Nose, Throat Hoarseness       PHYSICAL EXAM:  General: The patient was alert and conversant, and in no acute distress.    Neck: No palpable cervical lymphadenopathy, no  tenderness to palpation of the thyrohyoid space. No obvious thyroid abnormality.  Resp: Breathing comfortably, no stridor or stertor.  Neuro: Symmetric facial function. Other cranial nerve function as documented above.  Psych: Normal affect, pleasant and cooperative.  Voice/speech: Severe dysphonia characterized by breathiness, roughness, strain and diplophonia.      Intake scores  Last 2 Scores for Patient-Answered VHI Questionnaire      3/23/2023     9:27 AM 4/5/2023    10:26 AM   VHI Total Score   VHI Total Score 26 25       Procedure:   Flexible fiberoptic laryngoscopy and laryngovideostroboscopy  Indications: This procedure was warranted to evaluate the patient's laryngeal anatomy and function. Risks, benefits, and alternatives were discussed.  Description: After written informed consent was obtained, a time-out was performed to confirm patient identity, procedure, and procedure site. Topical 3% lidocaine with 0.25% phenylephrine  was applied to the nasal cavities. I performed the endoscopy and no complications were apparent. Continuous and stroboscopic light were utilized to assess routine phonation and variable frequency phonation.  Performed by: Serena Wood MD MPH  SLP: Santosh Rainey, PhD, CCC-SLP   Findings: Normal nasopharynx. Normal base of tongue, valleculae, and epiglottis. Vocal fold mobility: right: normal; left: brisk abduction, limited adduction. Medial edges of the true vocal folds: smooth and straight. No focal mucosal lesions were observed on the true vocal folds. Glissade produced appropriate elongation. There was mild to moderate supraglottic recruitment with connected speech. Mucosa of false vocal folds, aryepiglottic folds, piriform sinuses, and posterior glottis unremarkable. Airway was patent.        The addition of stroboscopy allowed evaluation of the mucosal wave. This was limited due to the glottic gap.  Amplitude: right: normal; left: appeared moderately decreased, but this may be related to poor entrainment. Symmetry: associated asymmetry. Closure pattern: incomplete. Closure plane: at glottic level. Phase distribution: open-phase predominant.                  IMPRESSION AND PLAN:   Clarice Contreras returns with presumed resorption of the injection material. She continues to have an unusual configuration of brisk, but limited unilateral vocal fold motion and a large glottic gap. Fortunately, the contralateral paresis appears to have improved.    Given her current presentation, we discussed consideration of medialization with a reversible and permanent approach given her prior history of contralateral vocal fold paresis as well as the unusual neurologic function of the left side. She is aware that we unfortunately do not anticipate that we will be able to help her reach a normal voice, and ablation of left vocal fold motion would not be advised even though it could help narrow the glottic gap, because of  the risk of airway compromise if her right vocal fold were to become paretic again. She is comfortable with all of this and states that mostly she is just eager to have a little more projection. She is less concerned about her voice quality.    We will likely plan to schedule a thyroplasty in May. She is amenable to this. I appreciate the opportunity to participate in the care of this pleasant patient.     Today's visit required additional screening time, PPE, and cleaning measures to allow for a safe in-person visit, due to the public health emergency. I spent a total of 35 minutes on 4/10/2023 in chart review, review of tests, patient visit, documentation, care coordination, and/or discussion with other providers about the issues documented above, separate from any documented procedure(s).        Serena Wood MD

## 2023-04-10 NOTE — PATIENT INSTRUCTIONS
1.  You were seen in the ENT Clinic today by . If you have any questions or concerns after your appointment, please call [phone]. Press option #1 for scheduling related needs. Press option #3 for Nurse advice.    2.   has recommended the following:   - consider augmentation    3.  Plan is to return to clinic for post op follow up TBD.      Rayna Vyas, JEFF  [phone]  [facility] - Otolaryngology

## 2023-04-17 ENCOUNTER — TELEPHONE (OUTPATIENT)
Dept: OTOLARYNGOLOGY | Facility: CLINIC | Age: 38
End: 2023-04-17
Payer: COMMERCIAL

## 2023-04-17 NOTE — TELEPHONE ENCOUNTER
Surgery is scheduled with Dr. Wood   Date: 5/18   Location: [facility]  Scheduled per: surgeon requested timeframe    H&P to be completed by: Primary Care team; patient to schedule     Post-op: TBD    Patient will receive a phone call from pre-admission nurses 1-2 days prior to surgery with arrival and start time.       Spoke with the patient and was able to confirm all scheduled information.       Patient questions/concerns: N/A       Surgery packet: to be sent via [employer]    __    Lori Steward Senior Perioperative Coordinator, on 4/17/2023 at 1:53 PM  P: [phone]

## 2023-04-17 NOTE — TELEPHONE ENCOUNTER
Left voicemail for patient regarding scheduling surgery with Dr. Wood.    Provided contact number to discuss.    P: [phone]    __    Lori Steward, Senior Perioperative Coordinator, on 4/17/2023 at 12:37 PM

## 2023-05-17 ENCOUNTER — ANESTHESIA EVENT (OUTPATIENT)
Dept: SURGERY | Facility: CLINIC | Age: 38
End: 2023-05-17
Payer: COMMERCIAL

## 2023-05-17 ASSESSMENT — LIFESTYLE VARIABLES: TOBACCO_USE: 0

## 2023-05-18 ENCOUNTER — ANESTHESIA (OUTPATIENT)
Dept: SURGERY | Facility: CLINIC | Age: 38
End: 2023-05-18
Payer: COMMERCIAL

## 2023-05-18 ENCOUNTER — HOSPITAL ENCOUNTER (OUTPATIENT)
Facility: CLINIC | Age: 38
Discharge: HOME | End: 2023-05-18
Attending: OTOLARYNGOLOGY | Admitting: OTOLARYNGOLOGY
Payer: COMMERCIAL

## 2023-05-18 VITALS
WEIGHT: 189.15 LBS | HEIGHT: 67 IN | BODY MASS INDEX: 29.69 KG/M2 | OXYGEN SATURATION: 98 % | SYSTOLIC BLOOD PRESSURE: 134 MMHG | HEART RATE: 57 BPM | TEMPERATURE: 98.5 F | DIASTOLIC BLOOD PRESSURE: 93 MMHG | RESPIRATION RATE: 14 BRPM

## 2023-05-18 PROCEDURE — 370N000017 HC ANESTHESIA TECHNICAL FEE, PER MIN: Performed by: OTOLARYNGOLOGY

## 2023-05-18 PROCEDURE — 999N000141 HC STATISTIC PRE-PROCEDURE NURSING ASSESSMENT: Performed by: OTOLARYNGOLOGY

## 2023-05-18 PROCEDURE — 250N000013 HC RX MED GY IP 250 OP 250 PS 637: Performed by: STUDENT IN AN ORGANIZED HEALTH CARE EDUCATION/TRAINING PROGRAM

## 2023-05-18 PROCEDURE — 250N000009 HC RX 250: Performed by: STUDENT IN AN ORGANIZED HEALTH CARE EDUCATION/TRAINING PROGRAM

## 2023-05-18 PROCEDURE — 272N000001 HC OR GENERAL SUPPLY STERILE: Performed by: OTOLARYNGOLOGY

## 2023-05-18 PROCEDURE — 710N000012 HC RECOVERY PHASE 2, PER MINUTE: Performed by: OTOLARYNGOLOGY

## 2023-05-18 PROCEDURE — 250N000011 HC RX IP 250 OP 636: Performed by: STUDENT IN AN ORGANIZED HEALTH CARE EDUCATION/TRAINING PROGRAM

## 2023-05-18 PROCEDURE — 250N000013 HC RX MED GY IP 250 OP 250 PS 637: Performed by: ANESTHESIOLOGY

## 2023-05-18 PROCEDURE — 250N000011 HC RX IP 250 OP 636: Performed by: OTOLARYNGOLOGY

## 2023-05-18 PROCEDURE — 31591 LARYNGOPLASTY MEDIALIZATION: CPT | Mod: LT | Performed by: OTOLARYNGOLOGY

## 2023-05-18 PROCEDURE — 360N000077 HC SURGERY LEVEL 4, PER MIN: Performed by: OTOLARYNGOLOGY

## 2023-05-18 PROCEDURE — 278N000051 HC OR IMPLANT GENERAL: Performed by: OTOLARYNGOLOGY

## 2023-05-18 PROCEDURE — 250N000009 HC RX 250: Performed by: OTOLARYNGOLOGY

## 2023-05-18 PROCEDURE — 258N000003 HC RX IP 258 OP 636: Performed by: STUDENT IN AN ORGANIZED HEALTH CARE EDUCATION/TRAINING PROGRAM

## 2023-05-18 DEVICE — IMPLANTABLE DEVICE: Type: IMPLANTABLE DEVICE | Site: NECK | Status: FUNCTIONAL

## 2023-05-18 RX ORDER — GLYCOPYRROLATE 0.2 MG/ML
INJECTION, SOLUTION INTRAMUSCULAR; INTRAVENOUS PRN
Status: DISCONTINUED | OUTPATIENT
Start: 2023-05-18 | End: 2023-05-18

## 2023-05-18 RX ORDER — HYDROMORPHONE HCL IN WATER/PF 6 MG/30 ML
0.2 PATIENT CONTROLLED ANALGESIA SYRINGE INTRAVENOUS EVERY 5 MIN PRN
Status: DISCONTINUED | OUTPATIENT
Start: 2023-05-18 | End: 2023-05-18 | Stop reason: HOSPADM

## 2023-05-18 RX ORDER — ONDANSETRON 4 MG/1
4 TABLET, ORALLY DISINTEGRATING ORAL EVERY 30 MIN PRN
Status: DISCONTINUED | OUTPATIENT
Start: 2023-05-18 | End: 2023-05-18 | Stop reason: HOSPADM

## 2023-05-18 RX ORDER — OXYCODONE HYDROCHLORIDE 5 MG/1
5 TABLET ORAL
Status: COMPLETED | OUTPATIENT
Start: 2023-05-18 | End: 2023-05-18

## 2023-05-18 RX ORDER — OXYCODONE HYDROCHLORIDE 5 MG/1
5 TABLET ORAL
Status: CANCELLED | OUTPATIENT
Start: 2023-05-18

## 2023-05-18 RX ORDER — SODIUM CHLORIDE, SODIUM LACTATE, POTASSIUM CHLORIDE, CALCIUM CHLORIDE 600; 310; 30; 20 MG/100ML; MG/100ML; MG/100ML; MG/100ML
INJECTION, SOLUTION INTRAVENOUS CONTINUOUS
Status: DISCONTINUED | OUTPATIENT
Start: 2023-05-18 | End: 2023-05-18 | Stop reason: HOSPADM

## 2023-05-18 RX ORDER — LABETALOL HYDROCHLORIDE 5 MG/ML
10 INJECTION, SOLUTION INTRAVENOUS
Status: DISCONTINUED | OUTPATIENT
Start: 2023-05-18 | End: 2023-05-18 | Stop reason: HOSPADM

## 2023-05-18 RX ORDER — SODIUM CHLORIDE, SODIUM LACTATE, POTASSIUM CHLORIDE, CALCIUM CHLORIDE 600; 310; 30; 20 MG/100ML; MG/100ML; MG/100ML; MG/100ML
INJECTION, SOLUTION INTRAVENOUS CONTINUOUS PRN
Status: DISCONTINUED | OUTPATIENT
Start: 2023-05-18 | End: 2023-05-18

## 2023-05-18 RX ORDER — OXYMETAZOLINE HYDROCHLORIDE 0.05 G/100ML
SPRAY NASAL PRN
Status: DISCONTINUED | OUTPATIENT
Start: 2023-05-18 | End: 2023-05-18 | Stop reason: HOSPADM

## 2023-05-18 RX ORDER — PROPOFOL 10 MG/ML
INJECTION, EMULSION INTRAVENOUS CONTINUOUS PRN
Status: DISCONTINUED | OUTPATIENT
Start: 2023-05-18 | End: 2023-05-18

## 2023-05-18 RX ORDER — OXYCODONE HYDROCHLORIDE 10 MG/1
10 TABLET ORAL
Status: DISCONTINUED | OUTPATIENT
Start: 2023-05-18 | End: 2023-05-18 | Stop reason: HOSPADM

## 2023-05-18 RX ORDER — EPINEPHRINE 0.1 MG/ML
INJECTION INTRAVENOUS PRN
Status: DISCONTINUED | OUTPATIENT
Start: 2023-05-18 | End: 2023-05-18 | Stop reason: HOSPADM

## 2023-05-18 RX ORDER — FENTANYL CITRATE 50 UG/ML
50 INJECTION, SOLUTION INTRAMUSCULAR; INTRAVENOUS EVERY 5 MIN PRN
Status: DISCONTINUED | OUTPATIENT
Start: 2023-05-18 | End: 2023-05-18 | Stop reason: HOSPADM

## 2023-05-18 RX ORDER — DEXAMETHASONE SODIUM PHOSPHATE 4 MG/ML
10 INJECTION, SOLUTION INTRA-ARTICULAR; INTRALESIONAL; INTRAMUSCULAR; INTRAVENOUS; SOFT TISSUE ONCE
Status: DISCONTINUED | OUTPATIENT
Start: 2023-05-18 | End: 2023-05-18 | Stop reason: HOSPADM

## 2023-05-18 RX ORDER — FENTANYL CITRATE 50 UG/ML
INJECTION, SOLUTION INTRAMUSCULAR; INTRAVENOUS PRN
Status: DISCONTINUED | OUTPATIENT
Start: 2023-05-18 | End: 2023-05-18

## 2023-05-18 RX ORDER — DEXAMETHASONE SODIUM PHOSPHATE 4 MG/ML
INJECTION, SOLUTION INTRA-ARTICULAR; INTRALESIONAL; INTRAMUSCULAR; INTRAVENOUS; SOFT TISSUE PRN
Status: DISCONTINUED | OUTPATIENT
Start: 2023-05-18 | End: 2023-05-18

## 2023-05-18 RX ORDER — LIDOCAINE HYDROCHLORIDE 20 MG/ML
INJECTION, SOLUTION INFILTRATION; PERINEURAL PRN
Status: DISCONTINUED | OUTPATIENT
Start: 2023-05-18 | End: 2023-05-18

## 2023-05-18 RX ORDER — DEXAMETHASONE SODIUM PHOSPHATE 4 MG/ML
10 INJECTION, SOLUTION INTRA-ARTICULAR; INTRALESIONAL; INTRAMUSCULAR; INTRAVENOUS; SOFT TISSUE ONCE
Status: COMPLETED | OUTPATIENT
Start: 2023-05-18 | End: 2023-05-18

## 2023-05-18 RX ORDER — HYDROMORPHONE HCL IN WATER/PF 6 MG/30 ML
0.4 PATIENT CONTROLLED ANALGESIA SYRINGE INTRAVENOUS EVERY 5 MIN PRN
Status: DISCONTINUED | OUTPATIENT
Start: 2023-05-18 | End: 2023-05-18 | Stop reason: HOSPADM

## 2023-05-18 RX ORDER — AMPICILLIN AND SULBACTAM 1; .5 G/1; G/1
1.5 INJECTION, POWDER, FOR SOLUTION INTRAMUSCULAR; INTRAVENOUS SEE ADMIN INSTRUCTIONS
Status: DISCONTINUED | OUTPATIENT
Start: 2023-05-18 | End: 2023-05-18 | Stop reason: HOSPADM

## 2023-05-18 RX ORDER — ACETAMINOPHEN 325 MG/1
975 TABLET ORAL ONCE
Status: COMPLETED | OUTPATIENT
Start: 2023-05-18 | End: 2023-05-18

## 2023-05-18 RX ORDER — ACETAMINOPHEN 325 MG/1
650 TABLET ORAL
Status: CANCELLED | OUTPATIENT
Start: 2023-05-18

## 2023-05-18 RX ORDER — LIDOCAINE 40 MG/G
CREAM TOPICAL
Status: DISCONTINUED | OUTPATIENT
Start: 2023-05-18 | End: 2023-05-18 | Stop reason: HOSPADM

## 2023-05-18 RX ORDER — FENTANYL CITRATE 50 UG/ML
25 INJECTION, SOLUTION INTRAMUSCULAR; INTRAVENOUS EVERY 5 MIN PRN
Status: DISCONTINUED | OUTPATIENT
Start: 2023-05-18 | End: 2023-05-18 | Stop reason: HOSPADM

## 2023-05-18 RX ORDER — LIDOCAINE HYDROCHLORIDE 20 MG/ML
INJECTION, SOLUTION INFILTRATION; PERINEURAL PRN
Status: DISCONTINUED | OUTPATIENT
Start: 2023-05-18 | End: 2023-05-18 | Stop reason: HOSPADM

## 2023-05-18 RX ORDER — AMPICILLIN AND SULBACTAM 2; 1 G/1; G/1
3 INJECTION, POWDER, FOR SOLUTION INTRAMUSCULAR; INTRAVENOUS
Status: COMPLETED | OUTPATIENT
Start: 2023-05-18 | End: 2023-05-18

## 2023-05-18 RX ORDER — ONDANSETRON 2 MG/ML
4 INJECTION INTRAMUSCULAR; INTRAVENOUS EVERY 30 MIN PRN
Status: DISCONTINUED | OUTPATIENT
Start: 2023-05-18 | End: 2023-05-18 | Stop reason: HOSPADM

## 2023-05-18 RX ORDER — HYDRALAZINE HYDROCHLORIDE 20 MG/ML
2.5-5 INJECTION INTRAMUSCULAR; INTRAVENOUS EVERY 10 MIN PRN
Status: DISCONTINUED | OUTPATIENT
Start: 2023-05-18 | End: 2023-05-18 | Stop reason: HOSPADM

## 2023-05-18 RX ADMIN — FENTANYL CITRATE 25 MCG: 50 INJECTION, SOLUTION INTRAMUSCULAR; INTRAVENOUS at 11:58

## 2023-05-18 RX ADMIN — FENTANYL CITRATE 50 MCG: 50 INJECTION, SOLUTION INTRAMUSCULAR; INTRAVENOUS at 11:08

## 2023-05-18 RX ADMIN — ACETAMINOPHEN 975 MG: 325 TABLET ORAL at 06:21

## 2023-05-18 RX ADMIN — FENTANYL CITRATE 25 MCG: 50 INJECTION, SOLUTION INTRAMUSCULAR; INTRAVENOUS at 10:50

## 2023-05-18 RX ADMIN — DEXAMETHASONE SODIUM PHOSPHATE 10 MG: 4 INJECTION, SOLUTION INTRA-ARTICULAR; INTRALESIONAL; INTRAMUSCULAR; INTRAVENOUS; SOFT TISSUE at 07:40

## 2023-05-18 RX ADMIN — AMPICILLIN SODIUM AND SULBACTAM SODIUM 1.5 G: 1; .5 INJECTION, POWDER, FOR SOLUTION INTRAMUSCULAR; INTRAVENOUS at 09:20

## 2023-05-18 RX ADMIN — DEXAMETHASONE SODIUM PHOSPHATE 10 MG: 4 INJECTION, SOLUTION INTRA-ARTICULAR; INTRALESIONAL; INTRAMUSCULAR; INTRAVENOUS; SOFT TISSUE at 13:56

## 2023-05-18 RX ADMIN — SODIUM CHLORIDE, POTASSIUM CHLORIDE, SODIUM LACTATE AND CALCIUM CHLORIDE: 600; 310; 30; 20 INJECTION, SOLUTION INTRAVENOUS at 07:20

## 2023-05-18 RX ADMIN — LIDOCAINE HYDROCHLORIDE 50 MG: 20 INJECTION, SOLUTION INFILTRATION; PERINEURAL at 07:32

## 2023-05-18 RX ADMIN — FENTANYL CITRATE 25 MCG: 50 INJECTION, SOLUTION INTRAMUSCULAR; INTRAVENOUS at 12:35

## 2023-05-18 RX ADMIN — AMPICILLIN SODIUM AND SULBACTAM SODIUM 3 G: 2; 1 INJECTION, POWDER, FOR SOLUTION INTRAMUSCULAR; INTRAVENOUS at 07:20

## 2023-05-18 RX ADMIN — OXYCODONE HYDROCHLORIDE 5 MG: 5 TABLET ORAL at 13:36

## 2023-05-18 RX ADMIN — PROPOFOL 75 MCG/KG/MIN: 10 INJECTION, EMULSION INTRAVENOUS at 09:40

## 2023-05-18 RX ADMIN — FENTANYL CITRATE 25 MCG: 50 INJECTION, SOLUTION INTRAMUSCULAR; INTRAVENOUS at 08:00

## 2023-05-18 RX ADMIN — MIDAZOLAM 2 MG: 1 INJECTION INTRAMUSCULAR; INTRAVENOUS at 07:32

## 2023-05-18 RX ADMIN — GLYCOPYRROLATE 0.2 MG: 0.2 INJECTION, SOLUTION INTRAMUSCULAR; INTRAVENOUS at 09:26

## 2023-05-18 RX ADMIN — AMPICILLIN SODIUM AND SULBACTAM SODIUM 1.5 G: 1; .5 INJECTION, POWDER, FOR SOLUTION INTRAMUSCULAR; INTRAVENOUS at 11:20

## 2023-05-18 RX ADMIN — PROPOFOL 50 MCG/KG/MIN: 10 INJECTION, EMULSION INTRAVENOUS at 07:32

## 2023-05-18 RX ADMIN — FENTANYL CITRATE 50 MCG: 50 INJECTION, SOLUTION INTRAMUSCULAR; INTRAVENOUS at 12:24

## 2023-05-18 ASSESSMENT — ACTIVITIES OF DAILY LIVING (ADL)
ADLS_ACUITY_SCORE: 35

## 2023-05-18 NOTE — INTERVAL H&P NOTE
"I have reviewed the surgical (or preoperative) H&P that is linked to this encounter, and examined the patient. There are no significant changes    Clinical Conditions Present on Arrival:  Clinically Significant Risk Factors Present on Admission                  # Overweight: Estimated body mass index is 29.63 kg/m  as calculated from the following:    Height as of this encounter: 1.702 m (5' 7\").    Weight as of this encounter: 85.8 kg (189 lb 2.5 oz).       "

## 2023-05-18 NOTE — ANESTHESIA CARE TRANSFER NOTE
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    Anesthesia Type:   MAC     Note:    Oropharynx: oropharynx clear of all foreign objects  Level of Consciousness: awake  Oxygen Supplementation: room air    Independent Airway: airway patency satisfactory and stable  Dentition: dentition unchanged  Vital Signs Stable: post-procedure vital signs reviewed and stable  Report to RN Given: handoff report given  Patient transferred to: Phase II  Comments: VSS. Breathing spontaneously at a regular rate with adequate tidal volumes and maintaining O2 sats. Denies nausea or pain. No apparent complications from anesthesia.     Darshan Barrow MD   Anesthesia CA-2    Handoff Report: Identifed the Patient, Identified the Reponsible Provider, Reviewed the pertinent medical history, Discussed the surgical course, Reviewed Intra-OP anesthesia mangement and issues during anesthesia, Set expectations for post-procedure period and Allowed opportunity for questions and acknowledgement of understanding      Vitals:  Vitals Value Taken Time   /83 05/18/23 1250   Temp 37  C (98.6  F) 05/18/23 1250   Pulse 66 05/18/23 1250   Resp 16 05/18/23 1250   SpO2 95 % 05/18/23 1252   Vitals shown include unvalidated device data.    Electronically Signed By: Darshan Barrow MD  May 18, 2023  1:05 PM

## 2023-05-18 NOTE — BRIEF OP NOTE
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  Complications: None.  Implants:   Implant Name Type Inv. Item Serial No.  Lot No. LRB No. Used Action   IMP SILICONE BLOCK [employer] [id number] - [id number] Other IMP SILICONE BLOCK [employer] [id number]  [employer] [id number] Left 1 Implanted         Maddy Clifford MD

## 2023-05-18 NOTE — ANESTHESIA PREPROCEDURE EVALUATION
Anesthesia Pre-Procedure Evaluation    Patient: Clarice Contreras   MRN: [id number] : 1985        Procedure : Procedure(s):  THYROPLASTY          History reviewed. No pertinent past medical history.   Past Surgical History:   Procedure Laterality Date     LARYNGOSCOPY, FLEXIBLE WITH INJECTION Left 2022    Procedure: LARYNGOSCOPY, FLEXIBLE WITH INJECTION;  Surgeon: Serena Wood MD;  Location: [facility] OR      No Known Allergies   Social History     Tobacco Use     Smoking status: Never     Smokeless tobacco: Never   Vaping Use     Vaping status: Not on file   Substance Use Topics     Alcohol use: Yes     Comment: rare      Wt Readings from Last 1 Encounters:   04/10/23 85.2 kg (187 lb 12.8 oz)        Anesthesia Evaluation   Pt has not had prior anesthetic         ROS/MED HX  ENT/Pulmonary: Comment: - dysphonia/hoarseness; denies dyspnea  - Vocal fold paralysis  - Unilateral partial vocal cord paralysis (unknown etiology; no h/o intubations or other interventions)  - Glottic insufficiency  - Hx of COVID 2022   (-) tobacco use and asthma   Neurologic:  - neg neurologic ROS     Cardiovascular:    (-) hypertension   METS/Exercise Tolerance: >4 METS    Hematologic:  - neg hematologic  ROS  (-) anemia   Musculoskeletal:  - neg musculoskeletal ROS     GI/Hepatic:  - neg GI/hepatic ROS     Renal/Genitourinary:  - neg Renal ROS     Endo:       Psychiatric/Substance Use:     (+) psychiatric history anxiety  (-) alcohol abuse history   Infectious Disease:  - neg infectious disease ROS     Malignancy:       Other:            Physical Exam    Airway      Comment: Hoarse, without SOB    Mallampati: I   TM distance: > 3 FB   Neck ROM: full   Mouth opening: > 3 cm    Respiratory Devices and Support         Dental       (+) Completely normal teeth      Cardiovascular   cardiovascular exam normal          Pulmonary   pulmonary exam normal                OUTSIDE LABS:  CBC: No results found for: WBC, HGB, HCT,  PLT  BMP: No results found for: NA, POTASSIUM, CHLORIDE, CO2, BUN, CR, GLC  COAGS: No results found for: PTT, INR, FIBR  POC: No results found for: BGM, HCG, HCGS  HEPATIC: No results found for: ALBUMIN, PROTTOTAL, ALT, AST, GGT, ALKPHOS, BILITOTAL, BILIDIRECT, TEMO  OTHER: No results found for: PH, LACT, A1C, REJI, PHOS, MAG, LIPASE, AMYLASE, TSH, T4, T3, CRP, SED    Anesthesia Plan    ASA Status:  1   NPO Status:  NPO Appropriate    Anesthesia Type: MAC.     - Reason for MAC: immobility needed, straight local not clinically adequate   Induction: Intravenous.   Maintenance: TIVA.   Techniques and Equipment:     - Lines/Monitors: 2nd IV     - Drips/Meds: Dexmed. infusion (propofol)     Consents    Anesthesia Plan(s) and associated risks, benefits, and realistic alternatives discussed. Questions answered and patient/representative(s) expressed understanding.     - Discussed: Risks, Benefits and Alternatives for BOTH SEDATION and the PROCEDURE were discussed     - Discussed with:  Patient      - Extended Intubation/Ventilatory Support Discussed: Yes.      - Patient is DNR/DNI Status: No    Use of blood products discussed: No .     Postoperative Care    Pain management: IV analgesics.   PONV prophylaxis: Ondansetron (or other 5HT-3), Dexamethasone or Solumedrol, Background Propofol Infusion     Comments:                Darshan Barrow MD

## 2023-05-18 NOTE — DISCHARGE INSTRUCTIONS
Light voice use 4 days  Remove dressing in 24hrs with dressing removal rubber band drain will fall out  Allow steri strips to fall off. Ok to trim edges as they roll up   Ok to shower after dressing taken off  Do not submerge incision in water until completely healed        [facility] [place]  Same-Day Surgery   Adult Discharge Orders & Instructions     For 24 hours after surgery    Get plenty of rest.  A responsible adult must stay with you for at least 24 hours after you leave the hospital.   Do not drive or use heavy equipment.  If you have weakness or tingling, don't drive or use heavy equipment until this feeling goes away.  Do not drink alcohol.  Avoid strenuous or risky activities.  Ask for help when climbing stairs.   You may feel lightheaded.  IF so, sit for a few minutes before standing.  Have someone help you get up.   If you have nausea (feel sick to your stomach): Drink only clear liquids such as apple juice, ginger ale, broth or 7-Up.  Rest may also help.  Be sure to drink enough fluids.  Move to a regular diet as you feel able.  You may have a slight fever. Call the doctor if your fever is over 100 F (37.7 C) (taken under the tongue) or lasts longer than 24 hours.  You may have a dry mouth, a sore throat, muscle aches or trouble sleeping.  These should go away after 24 hours.  Do not make important or legal decisions.   Call your doctor for any of the followin.  Signs of infection (fever, growing tenderness at the surgery site, a large amount of drainage or bleeding, severe pain, foul-smelling drainage, redness, swelling).    2. It has been over 8 to 10 hours since surgery and you are still not able to urinate (pass water).    3.  Headache for over 24 hours.    4.  Numbness, tingling or weakness the day after surgery (if you had spinal anesthesia).  To contact a doctor, call Dr Wood at the ENT- Otolaryngology Clinic at [phone]  or:    ' [phone] and  ask for the resident on call for   Otolaryngology/ENT (answered 24 hours a day)  '   Emergency Department:    [facility]: [phone]       (TTY for hearing impaired: [phone])    [facility]: [phone]       (TTY for hearing impaired: [phone])

## 2023-05-18 NOTE — ANESTHESIA POSTPROCEDURE EVALUATION
Patient: Clarice Contreras    Procedure: Procedure(s):  THYROPLASTY, FLEXIBLE Laryngoscopy       Anesthesia Type:  MAC    Note:  Disposition: Outpatient   Postop Pain Control: Uneventful            Sign Out: Well controlled pain   PONV: No   Neuro/Psych: Uneventful            Sign Out: Acceptable/Baseline neuro status   Airway/Respiratory: Uneventful            Sign Out: Acceptable/Baseline resp. status   CV/Hemodynamics: Uneventful            Sign Out: Acceptable CV status; No obvious hypovolemia; No obvious fluid overload   Other NRE: NONE   DID A NON-ROUTINE EVENT OCCUR? No           Last vitals:  Vitals Value Taken Time   /83 05/18/23 1250   Temp 37  C (98.6  F) 05/18/23 1250   Pulse 66 05/18/23 1250   Resp 16 05/18/23 1250   SpO2 95 % 05/18/23 1252   Vitals shown include unvalidated device data.    Electronically Signed By: Tarik Watson MD  May 18, 2023  1:06 PM

## 2023-06-01 NOTE — PROGRESS NOTES
[facility]  Clinical Voice and Upper Airway Evaluation Report    Patient's Name: Clarice Contreras  Date of Evaluation: 6/2/2023  Providing SLP: Joie Black MS CCC-SLP  Seen in Conjunction With: Serena Wood MD MPH  Insurance Coverage: [email or web address]  Chief Complaint: Dysphonia  Evaluation Location: [facility]  Time of Evaluation: 8:08 - 8:35 AM      Impressions from initial evaluation on 8/22/22 by Joie Black MS CCC-SLP:   Clarice presents today with dysphonia beginning about 1.5 years ago during allergy season. Perceptually, her voice is moderately rough, breathy, weak, and strained with persistent diplophonia in all voice situations. Under laryngoscopy, the L vocal fold is very thin with reduced pliability and adduction to midline as well as aperiodic vibration. The R vocal fold's movement is intermittently and mildly sluggish with increased hyperfunction on this side. Stimulability with various SOVT tasks was not helpful in improving Clarice's voice quality or laryngoscopy findings today. For these reasons, voice therapy is not recommended at this time until surgical intervention and imaging have happened with Dr. Wood's guidance. Re-evaluation is recommended prior to beginning voice therapy at this time.       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 Unfortunately, there has not been a particular etiology discovered yet, despite neck CT and MRI brain scan in August 2022, which were both normal. She underwent a left vocal fold prolaryn gel injection by Dr. Wood on 12/2/22. Laryngoscopy revealed that the left vocal fold is atrophic and paretic in the paramedial  position, while the right vocal fold demonstrates compensatory mod-severe supraglottic hyperfunction, but a large glottic gap remains. The right vocal fold function is also inconsistent and occasionally demonstrates some sluggish and reduced movement, but overall displays full ROM. She perceptually demonstrated severely breathy voice quality with moderate strain and mild-moderate inconsistent roughness, large pitch fluctuation between a lower than normal register (117Hz) and higher than normal register (370Hz), poor respiratory mechanics with excessive effort and air flow, and perilaryngeal hyperfunction. She did benefit from AP and lateral laryngeal palpation along with visual feedback of air flow using a streamer. Given the inconsistent nature of her right vocal fold movement and unknown etiology, Dr. Wood expressed some hesitation to do a permanent vocal fold implant, but will plan to reassess and decide on the next step once the current prolaryn gel has fully reabsorbed.      She would benefit from a course of speech therapy targeting reduced perilaryngeal hyperfunction, improved respiratory mechanics, and strategies to optimize her voice production while reduced strain and effort. Due to concern of airway impairment should she undergo a permanent implant procedure and then potentially have worsening vocal fold function, possibly leading to one or both vocal folds stuck in the paramedial or medial position, we will also plan to instruct her in use of upper airway aBductory maneuvers and laryngeal release techniques in order to facilitate sufficient breath flow as much as possible. She was also provided information today about personal amplification devices in order to aid her in being heard and participating more fully as the prolaryn gel reabsorbs.      ? A course of speech therapy is recommended to improve voice quality and promote reduced discomfort, effort and fatigue.  ? Return to see Dr. Wood around  April 2023 when the filler should hopefully have reabsorbed. Will then re-evaluate in anticipation of surgery for vocal fold implant and will double-check scheduling of pre/post-surgical therapy.   ? She demonstrates a Good prognosis for improvement given adherence to therapeutic recommendations.   - Positive indicators: positive response to therapy probes high level of comittment  - Negative indicators: None      Impressions from most recent voice therapy session on 3/23/23 with Joie Black MS CCC-SLP:   Clarice's voice has continued to worsen since her most recent session, likely indicating that her previous vocal fold augmentation has continued to be reabsorbed. We discussed perioperative care surrounding a thyroplasty and answered Clarice's questions. She will follow-up with Dr. Wood on 4/10/23 to further discuss the possibility of this procedure, answer additional questions, and address other pre-operative needs. No voice SLP services are warranted for this visit, as she has been fully optimized from a voice and airway perspective at this time. I plan to see her about 3 week post-op to check in with regard to her voice and breathing and re-assess if further care is needed. Clarice is in agreement with this plan of care.      Updated patient history from today's evaluation:  Clarice underwent a thyroplasty procedure with Dr. Wood on 5/18/23 secondary to her vocal fold immobility. She feels that her voice is sounding 90% of her pre-op voice today, and she is having a much easier time communicating. She did have some aspiration afterwards due to some difficulty with coordination. At this time, she feels that her swallowing is more due to increased awareness than a true risk of aspiration. She denies dyspnea since her thyroplasty.      Quality of Life Questionnaires:    Patient Supplied Answers To Last 2 VHI Questionnaires      3/23/2023     9:27 AM 4/5/2023    10:26 AM   Voice Handicap Index (VHI-10)   My  "voice makes it difficult for people to hear me 3 3   People have difficulty understanding me in a noisy room 4 4   My voice difficulties restrict my personal and social life.  1 2   I feel left out of conversations because of my voice 3 2   My voice problem causes me to lose income 1 0   I feel as though I have to strain to produce voice 3 3   The clarity of my voice is unpredictable 3 3   My voice problem upsets me 2 2   My voice makes me feel handicapped 2 2   People ask, \"What's wrong with your voice?\" 4 4   VHI-10 26 25       Perceptual Analysis (30549): Evaluation of Voice / Speech / Non-Communicative Laryngeal Behaviors    The GRBAS is a perceptual rating of voice change. 0 indicated no impairment, 3 indicates a severe impairment. \"C\" and \"I\" may be used to signify if these feature was observed consistently or intermittently respectively. This is a rating based on clinical judgement of disordered voice quality.  G ( 1 ) General Dysphonia     R ( 0-1 ) Roughness     B ( 0-1 ) Breathiness     A ( 1 ) Asthenia     S ( 1 ) Strain    *Validity of this measure may be slightly reduced when performed via acoustic signal from virtual visit*    Additional observations:     Coughing / throat clearing: not observed    Breathing patterns: WNL at rest    Overt tension: \" \"    Habitual pitch: WNL for age- and gender-matched peers    Pitch range: \" \"    Maximum phonation time at normal pitch and loudness on /a/ vowel: not assessed today    Resonance: mid-oral and intermittently back-focused    Loudness: mildly reduced      Laryngoscopy with stroboscopy:    Provider performing exam: Serena Wood MD MPH    Informed consent: Informed verbal consent was obtained, which includes potential side-effects, risks, and benefits of the procedure.    Anesthetic: Topical anesthesia with 3% lidocaine and 0.25% phenylephrine was applied the nostrils bilaterally.    Scope type: A distal chip flexible laryngoscope was passed through the " nare with LED light source(s).     The laryngeal and pharyngeal structures were evaluated for gross appearance, mobility, function, and focal lesions / abnormalities of the associated mucosa.    Velar Function: complete closure without bubbling of secretions and no weakness observed     General Appearance: mild interarytenoid pachydermia    Secretions: WNL     Subglottis Appearance: visible portion is patent     R Arytenoid Abduction / Adduction: timely ab/adduction with appropriate range of motion     L Arytenoid Abduction / Adduction: timely arytenoid ab/adduction noted with true vocal fold immobile from a paramedian (e.g. near medialized position) secondary to thyroplasty    Mediolateral Compression: R>L during phonation    Anteroposterior Compression: note during phonation    Vocal Fold Elongation: appropriate     Left (L) Vocal Fold Edge and Mucosa: white with full appearance, particularly in the posterior and inferior aspect    Right (R) Vocal Fold Edge: white with mild infraglottic fullness    Glottic Closure: incomplete    Phase Closure: predominantly open phase     Vertical Level of Approximation: true vocal fold appear to adduct at the same height     L Amplitude: absent    R Amplitude: moderately reduced    L Mucosal Wave: absent    R Mucosal Wave: mildly reduced    Phase Symmetry: asymmetrical    Periodicity: periodic    Inhalation Phonation: similar findings to exhalation phonation       Impressions and Plan:     Clarice is a 38-year-old female presenting today with dysphonia R49.0 secondary to L vocal fold atrophy J38.3 and laryngeal hyperfunction J38.7. Perceptually, her voice is mildly weak and strained with minimal roughness and breathiness. Laryngoscopy today demonstrated fullness and reduced pliability of the L vocal fold post-thyroplasty and improved but not complete glottic closure. Laryngeal hyperfunction is also noted. At this time, Clarice feels that her voice is continuing to improve and is  at 90% of her normal today. She would like to continue working on her voice independently at this time and will return for SLP services if needed. She will follow-up with Dr. Wood in 6-8 weeks. Clarice is in agreement with this plan of care.      Billed Procedures:      Perceptual voice assessment 85158    Assessment and treatment time: 27 minutes    Chart review, interpretation of testing, documentation preparation, etc: 21 minutes      Thank you for allowing me to participate in this patient's care,    Joie Black MS CCC-SLP  Speech-Language Pathologist  [facility]  Department of Otolaryngology - Head and Neck Surgery  [facility]  [email or web address]  Direct: [phone]  [phone]    *This note may have been completed using [id number] dictation software, so errors may exist. Please contact me for clarification if needed*

## 2023-06-02 ENCOUNTER — OFFICE VISIT (OUTPATIENT)
Dept: OTOLARYNGOLOGY | Facility: CLINIC | Age: 38
End: 2023-06-02
Payer: COMMERCIAL

## 2023-06-02 VITALS
OXYGEN SATURATION: 99 % | DIASTOLIC BLOOD PRESSURE: 81 MMHG | SYSTOLIC BLOOD PRESSURE: 133 MMHG | BODY MASS INDEX: 30.29 KG/M2 | HEIGHT: 67 IN | HEART RATE: 55 BPM | WEIGHT: 193 LBS

## 2023-06-02 DIAGNOSIS — J38.3 VOCAL FOLD ATROPHY: ICD-10-CM

## 2023-06-02 DIAGNOSIS — J38.01 UNILATERAL PARTIAL VOCAL CORD PARALYSIS: ICD-10-CM

## 2023-06-02 DIAGNOSIS — R49.0 DYSPHONIA: Primary | ICD-10-CM

## 2023-06-02 DIAGNOSIS — J38.7 LARYNGEAL HYPERFUNCTION: ICD-10-CM

## 2023-06-02 DIAGNOSIS — J38.3 GLOTTIC INSUFFICIENCY: ICD-10-CM

## 2023-06-02 PROCEDURE — 31579 LARYNGOSCOPY TELESCOPIC: CPT | Mod: 79 | Performed by: OTOLARYNGOLOGY

## 2023-06-02 PROCEDURE — 92524 BEHAVRAL QUALIT ANALYS VOICE: CPT | Mod: GN | Performed by: SPEECH-LANGUAGE PATHOLOGIST

## 2023-06-02 ASSESSMENT — PAIN SCALES - GENERAL: PAINLEVEL: NO PAIN (0)

## 2023-06-02 NOTE — LETTER
6/2/2023      RE: Clarice Contreras  [address] [address]       Dear Colleague:    Clarice Contreras recently returned for follow-up at the [facility]. My clinic note from our visit is enclosed below.  Speech recognition software may have been used in the documentation below; input is reviewed before signature to the best of my ability.     I appreciate the ongoing opportunity to participate in this patient's care.    Please feel free to contact me with any questions.    Sincerely yours,      Serena Wood M.D., M.P.H.  , Laryngology  Director, [facility]  Otolaryngology- Head & Neck Surgery  [phone]            =====  HISTORY OF PRESENT ILLNESS:  Clarice Contreras is a pleasant 38-year-old female with a history of right true vocal fold with intermittently decreased motion and a severely atrophic left true vocal fold with brisk motion but limited adduction, leading to large phonatory gap.    Given the unusual configuration and bilateral findings we completed a CT scan of the neck with contrast and MRI scan of the brain with contrast in August 2022 and both were unremarkable.    She is status post flexible fiberoptic transnasal laryngoscopy with percutaneous transcervical Prolaryn Gel injection to left true vocal fold on 12/1/22. This was helpful with voice, swallow, and breathing.    After careful consideration due to her history of contralateral paresis, we proceeded with a left thyroplasty with Silastic on 5/18/23. The optimal point of maximal medialization was very narrow.    Today's updates:  - Her voice is doing much better. It is 90% back to normal. She feels it is still changing day by day, and feels it is still improving. She is glad not to be having diplophonia.  - Her swallowing was difficult at first after surgery, and coordination required some attention, but she feels now it is better than it was before the  thyroplasty, although she is still being careful because she does not want to choke, cough hard, and risk displacing anything.  - Her breathing is fine.  - No wound problems.  - No new past medical/past surgical history.       MEDICATIONS:     Current Outpatient Medications   Medication Sig Dispense Refill    PARoxetine (PAXIL) 20 MG tablet Take 20 mg by mouth daily         ALLERGIES:  No Known Allergies    NEW PMH/PSH: None    REVIEW OF SYSTEMS:  The patient completed a comprehensive 11 point review of systems (below), which was reviewed. Positives are as noted below.  Patient Supplied Answers to Review of Systems      4/5/2023    10:24 AM    ENT ROS   Ears, Nose, Throat Hoarseness       PHYSICAL EXAM:  General: The patient was alert and conversant, and in no acute distress.    Neck: No palpable cervical lymphadenopathy, no obvious thyroid abnormality. Incision healing well.  Resp: Breathing comfortably, no stridor or stertor.  Neuro: Symmetric facial function. Other cranial nerve function as documented above.  Psych: Normal affect, pleasant and cooperative.  Voice/speech: Mild dysphonia characterized by breathiness and roughness. No diplophonia.      Procedure:   Flexible fiberoptic laryngoscopy and laryngovideostroboscopy  Indications: This procedure was warranted to evaluate the patient's laryngeal anatomy and function. Risks, benefits, and alternatives were discussed.  Description: After written informed consent was obtained, a time-out was performed to confirm patient identity, procedure, and procedure site. Topical 3% lidocaine with 0.25% phenylephrine was applied to the nasal cavities. I performed the endoscopy and no complications were apparent. Continuous and stroboscopic light were utilized to assess routine phonation and variable frequency phonation.  Performed by: Serena Wood MD MPH  SLP: Joie Black MS CCC-SLP   Findings: Normal nasopharynx. Normal base of tongue, valleculae, and  epiglottis. Vocal fold mobility: right: normal; left: impaired with reduced adduction but brisk full abduction. Medial edges of the true vocal folds: smooth and straight on right; left with mid-posterior bulge consistent with thyroplasty history. No focal mucosal lesions were observed on the true vocal folds. Glissade produced appropriate elongation. There was moderate supraglottic recruitment with connected speech. Mucosa of false vocal folds, aryepiglottic folds, piriform sinuses, and posterior glottis unremarkable. Airway was patent.   No focal lesions on NBI.    The addition of stroboscopy allowed evaluation of the mucosal wave.   Amplitude: right: normal; left: severely decreased. Symmetry: associated asymmetry. Closure pattern: posterior gap. Closure plane: at glottic level. Phase distribution: open-phase predominant.                                IMPRESSION AND PLAN:   Clarice Contreras returns with an excellent post-op result so far. The incision is healing well and she has dramatically improved voice quality. For now, she is feeling comfortable continuing to work independently with her evolving voice. We discussed that her voice will likely continue to change in the coming weeks and months, and we can consider revisiting voice/speech therapy if she finds herself struggling vocally in the future.    Plan:  1) She will return to clinic in 6-8 weeks for a recheck.  2) Speech therapy as needed.    I appreciate the opportunity to participate in the care of this pleasant patient.     I spent a total of 20 minutes on 6/2/2023 in chart review, review of tests, patient visit, documentation, care coordination, and/or discussion with other providers about the issues documented above, separate from any documented procedure(s). Visit is within global period for thyroplasty.        Serena Wood MD

## 2023-06-02 NOTE — PROGRESS NOTES
Dear Colleague:    Clarice Contreras recently returned for follow-up at the [facility]. My clinic note from our visit is enclosed below.  Speech recognition software may have been used in the documentation below; input is reviewed before signature to the best of my ability.     I appreciate the ongoing opportunity to participate in this patient's care.    Please feel free to contact me with any questions.    Sincerely yours,      Serena Wood M.D., M.P.H.  , Laryngology  Director, [facility]  Otolaryngology- Head & Neck Surgery  [phone]            =====  HISTORY OF PRESENT ILLNESS:  Clarice Contreras is a pleasant 38-year-old female with a history of right true vocal fold with intermittently decreased motion and a severely atrophic left true vocal fold with brisk motion but limited adduction, leading to large phonatory gap.    Given the unusual configuration and bilateral findings we completed a CT scan of the neck with contrast and MRI scan of the brain with contrast in August 2022 and both were unremarkable.    She is status post flexible fiberoptic transnasal laryngoscopy with percutaneous transcervical Prolaryn Gel injection to left true vocal fold on 12/1/22. This was helpful with voice, swallow, and breathing.    After careful consideration due to her history of contralateral paresis, we proceeded with a left thyroplasty with Silastic on 5/18/23. The optimal point of maximal medialization was very narrow.    Today's updates:  - Her voice is doing much better. It is 90% back to normal. She feels it is still changing day by day, and feels it is still improving. She is glad not to be having diplophonia.  - Her swallowing was difficult at first after surgery, and coordination required some attention, but she feels now it is better than it was before the thyroplasty, although she is still being careful because she does not want to choke, cough  hard, and risk displacing anything.  - Her breathing is fine.  - No wound problems.  - No new past medical/past surgical history.       MEDICATIONS:     Current Outpatient Medications   Medication Sig Dispense Refill     PARoxetine (PAXIL) 20 MG tablet Take 20 mg by mouth daily         ALLERGIES:  No Known Allergies    NEW PMH/PSH: None    REVIEW OF SYSTEMS:  The patient completed a comprehensive 11 point review of systems (below), which was reviewed. Positives are as noted below.  Patient Supplied Answers to Review of Systems      4/5/2023    10:24 AM    ENT ROS   Ears, Nose, Throat Hoarseness       PHYSICAL EXAM:  General: The patient was alert and conversant, and in no acute distress.    Neck: No palpable cervical lymphadenopathy, no obvious thyroid abnormality. Incision healing well.  Resp: Breathing comfortably, no stridor or stertor.  Neuro: Symmetric facial function. Other cranial nerve function as documented above.  Psych: Normal affect, pleasant and cooperative.  Voice/speech: Mild dysphonia characterized by breathiness and roughness. No diplophonia.      Procedure:   Flexible fiberoptic laryngoscopy and laryngovideostroboscopy  Indications: This procedure was warranted to evaluate the patient's laryngeal anatomy and function. Risks, benefits, and alternatives were discussed.  Description: After written informed consent was obtained, a time-out was performed to confirm patient identity, procedure, and procedure site. Topical 3% lidocaine with 0.25% phenylephrine was applied to the nasal cavities. I performed the endoscopy and no complications were apparent. Continuous and stroboscopic light were utilized to assess routine phonation and variable frequency phonation.  Performed by: Serena Wood MD MPH  SLP: Joie Black MS CCC-SLP   Findings: Normal nasopharynx. Normal base of tongue, valleculae, and epiglottis. Vocal fold mobility: right: normal; left: impaired with reduced adduction but brisk  full abduction. Medial edges of the true vocal folds: smooth and straight on right; left with mid-posterior bulge consistent with thyroplasty history. No focal mucosal lesions were observed on the true vocal folds. Glissade produced appropriate elongation. There was moderate supraglottic recruitment with connected speech. Mucosa of false vocal folds, aryepiglottic folds, piriform sinuses, and posterior glottis unremarkable. Airway was patent.   No focal lesions on NBI.    The addition of stroboscopy allowed evaluation of the mucosal wave.   Amplitude: right: normal; left: severely decreased. Symmetry: associated asymmetry. Closure pattern: posterior gap. Closure plane: at glottic level. Phase distribution: open-phase predominant.                                IMPRESSION AND PLAN:   Clarice Contreras returns with an excellent post-op result so far. The incision is healing well and she has dramatically improved voice quality. For now, she is feeling comfortable continuing to work independently with her evolving voice. We discussed that her voice will likely continue to change in the coming weeks and months, and we can consider revisiting voice/speech therapy if she finds herself struggling vocally in the future.    Plan:  1) She will return to clinic in 6-8 weeks for a recheck.  2) Speech therapy as needed.    I appreciate the opportunity to participate in the care of this pleasant patient.     I spent a total of 20 minutes on 6/2/2023 in chart review, review of tests, patient visit, documentation, care coordination, and/or discussion with other providers about the issues documented above, separate from any documented procedure(s). Visit is within global period for thyroplasty.

## 2023-06-02 NOTE — PATIENT INSTRUCTIONS
1.  You were seen in the ENT Clinic today by . If you have any questions or concerns after your appointment, please call [phone]. Press option #1 for scheduling related needs. Press option #3 for Nurse advice.    2.  Plan is to return to clinic 6-8 weeks      Rayna Vyas LPN  [phone]  [facility] - Otolaryngology

## 2023-07-09 ENCOUNTER — HEALTH MAINTENANCE LETTER (OUTPATIENT)
Age: 38
End: 2023-07-09

## 2023-08-04 ENCOUNTER — OFFICE VISIT (OUTPATIENT)
Dept: OTOLARYNGOLOGY | Facility: CLINIC | Age: 38
End: 2023-08-04
Payer: COMMERCIAL

## 2023-08-04 VITALS — SYSTOLIC BLOOD PRESSURE: 115 MMHG | HEART RATE: 73 BPM | DIASTOLIC BLOOD PRESSURE: 71 MMHG

## 2023-08-04 DIAGNOSIS — R49.0 DYSPHONIA: Primary | ICD-10-CM

## 2023-08-04 DIAGNOSIS — J38.01 UNILATERAL PARTIAL VOCAL CORD PARALYSIS: ICD-10-CM

## 2023-08-04 DIAGNOSIS — J38.3 VOCAL FOLD ATROPHY: Primary | ICD-10-CM

## 2023-08-04 DIAGNOSIS — R49.0 DYSPHONIA: ICD-10-CM

## 2023-08-04 PROCEDURE — 99207 PR NO BILLABLE SERVICE THIS VISIT: CPT | Performed by: SPEECH-LANGUAGE PATHOLOGIST

## 2023-08-04 PROCEDURE — 31579 LARYNGOSCOPY TELESCOPIC: CPT | Performed by: OTOLARYNGOLOGY

## 2023-08-04 PROCEDURE — 99213 OFFICE O/P EST LOW 20 MIN: CPT | Mod: 25 | Performed by: OTOLARYNGOLOGY

## 2023-08-04 ASSESSMENT — PAIN SCALES - GENERAL: PAINLEVEL: NO PAIN (0)

## 2023-08-04 NOTE — PROGRESS NOTES
Dear Colleague:    Clarice Contreras recently returned for follow-up at the [facility]. My clinic note from our visit is enclosed below.  Speech recognition software may have been used in the documentation below; input is reviewed before signature to the best of my ability.     I appreciate the ongoing opportunity to participate in this patient's care.    Please feel free to contact me with any questions.    Sincerely yours,        Serena Wood M.D., M.P.H.  , Laryngology  Director, [facility]  Otolaryngology- Head & Neck Surgery  [phone]            =====  HISTORY OF PRESENT ILLNESS:  Clarice Contreras is a pleasant 38-year-old female with a history of right true vocal fold with intermittently decreased motion and a severely atrophic left true vocal fold with brisk motion but limited adduction, leading to large phonatory gap.    Given the unusual configuration and bilateral findings we completed a CT scan of the neck with contrast and MRI scan of the brain with contrast in August 2022 and both were unremarkable.    She is status post flexible fiberoptic transnasal laryngoscopy with percutaneous transcervical Prolaryn Gel injection to left true vocal fold on 12/1/22. This was helpful with voice, swallow, and breathing.    After careful consideration due to her history of contralateral paresis, we proceeded with a left thyroplasty with Silastic on 5/18/23. The optimal point of maximal medialization was very narrow.    Today's updates:  - Her voice feels like her own voice, no vocal fatigue.  - No swallow problems.  - Her breathing feels fine.        MEDICATIONS:     Current Outpatient Medications   Medication Sig Dispense Refill     PARoxetine (PAXIL) 20 MG tablet Take 20 mg by mouth daily         ALLERGIES:  No Known Allergies    NEW PMH/PSH: None    REVIEW OF SYSTEMS:  The patient completed a comprehensive 11 point review of systems (below),  which was reviewed. Positives are as noted below.  Patient Supplied Answers to Review of Systems        PHYSICAL EXAM:  General: The patient was alert and conversant, and in no acute distress.    Neck: No palpable cervical lymphadenopathy, no significant tenderness to palpation of the thyrohyoid space, which was mildly narrow. No obvious thyroid abnormality. Well-healed incision.  Resp: Breathing comfortably, no stridor or stertor.  Neuro: Symmetric facial function. Other cranial nerve function as documented above.  Psych: Normal affect, pleasant and cooperative.  Voice/speech: Mild dysphonia characterized by breathiness and strain.       Procedure:   Flexible fiberoptic laryngoscopy and laryngovideostroboscopy  Indications: This procedure was warranted to evaluate the patient's laryngeal anatomy and function. Risks, benefits, and alternatives were discussed.  Description: After written informed consent was obtained, a time-out was performed to confirm patient identity, procedure, and procedure site. Topical 3% lidocaine with 0.25% phenylephrine was applied to the nasal cavities. I performed the endoscopy and no complications were apparent. Continuous and stroboscopic light were utilized to assess routine phonation and variable frequency phonation.  Performed by: Serena Wood MD MPH  SLP: Joie Black MS CCC-SLP   Findings: Normal nasopharynx. Mild tremulousness/weakness of palate closure. Normal base of tongue, valleculae, and epiglottis. Vocal fold mobility: right: normal; left: full abduction, limited adduction. Medial edges of the true vocal folds: smooth. No focal mucosal lesions were observed on the true vocal folds. Left with implant contour visible infraglottically under mucosa. Glissade produced appropriate elongation. There was mild to moderate supraglottic recruitment with connected speech. Mucosa of false vocal folds, aryepiglottic folds, piriform sinuses, and posterior glottis unremarkable.  Airway was patent.      No additional findings on NBI.    The addition of stroboscopy allowed evaluation of the mucosal wave. Intermittent entrainment, most consistent around F# or G4 today.  Amplitude: right: normal; left: normal. Symmetry: intermittent symmetry. Closure pattern: complete. Closure plane: at glottic level. Phase distribution: open-phase predominant, but dramatically improved compared to pre-operative.                IMPRESSION AND PLAN:   Clarice Contreras returns with ongoing vocal improvement. She is very happy with her vocal outcome and people have been commenting that she sounds like herself again.    We will plan a routine follow up in 6 months or sooner as needed. At that time, we will want to recheck vocal fold mobility and palate function.    I spent a total of 22 minutes on 8/4/2023 in chart review, review of tests, patient visit, documentation, care coordination, and/or discussion with other providers about the issues documented above, separate from any documented procedure(s).

## 2023-08-04 NOTE — LETTER
8/4/2023       RE: Clarice Contreras  [address] [facility] [address]     Dear Colleague,    Thank you for referring your patient, Clarice Contreras, to the [facility] [place] [facility]. Please see a copy of my visit note below.    Dear Colleague:    Clarice Contreras recently returned for follow-up at the [facility]. My clinic note from our visit is enclosed below.  Speech recognition software may have been used in the documentation below; input is reviewed before signature to the best of my ability.     I appreciate the ongoing opportunity to participate in this patient's care.    Please feel free to contact me with any questions.    Sincerely yours,        Serena Wood M.D., M.P.H.  , Laryngology  Director, [facility]  Otolaryngology- Head & Neck Surgery  [phone]            =====  HISTORY OF PRESENT ILLNESS:  Clarice Contreras is a pleasant 38-year-old female with a history of right true vocal fold with intermittently decreased motion and a severely atrophic left true vocal fold with brisk motion but limited adduction, leading to large phonatory gap.    Given the unusual configuration and bilateral findings we completed a CT scan of the neck with contrast and MRI scan of the brain with contrast in August 2022 and both were unremarkable.    She is status post flexible fiberoptic transnasal laryngoscopy with percutaneous transcervical Prolaryn Gel injection to left true vocal fold on 12/1/22. This was helpful with voice, swallow, and breathing.    After careful consideration due to her history of contralateral paresis, we proceeded with a left thyroplasty with Silastic on 5/18/23. The optimal point of maximal medialization was very narrow.    Today's updates:  - Her voice feels like her own voice, no vocal fatigue.  - No swallow problems.  - Her  breathing feels fine.        MEDICATIONS:     Current Outpatient Medications   Medication Sig Dispense Refill     PARoxetine (PAXIL) 20 MG tablet Take 20 mg by mouth daily         ALLERGIES:  No Known Allergies    NEW PMH/PSH: None    REVIEW OF SYSTEMS:  The patient completed a comprehensive 11 point review of systems (below), which was reviewed. Positives are as noted below.  Patient Supplied Answers to Review of Systems        PHYSICAL EXAM:  General: The patient was alert and conversant, and in no acute distress.    Neck: No palpable cervical lymphadenopathy, no significant tenderness to palpation of the thyrohyoid space, which was mildly narrow. No obvious thyroid abnormality. Well-healed incision.  Resp: Breathing comfortably, no stridor or stertor.  Neuro: Symmetric facial function. Other cranial nerve function as documented above.  Psych: Normal affect, pleasant and cooperative.  Voice/speech: Mild dysphonia characterized by breathiness and strain.       Procedure:   Flexible fiberoptic laryngoscopy and laryngovideostroboscopy  Indications: This procedure was warranted to evaluate the patient's laryngeal anatomy and function. Risks, benefits, and alternatives were discussed.  Description: After written informed consent was obtained, a time-out was performed to confirm patient identity, procedure, and procedure site. Topical 3% lidocaine with 0.25% phenylephrine was applied to the nasal cavities. I performed the endoscopy and no complications were apparent. Continuous and stroboscopic light were utilized to assess routine phonation and variable frequency phonation.  Performed by: Serena Wood MD MPH  SLP: Joie Black MS CCC-SLP   Findings: Normal nasopharynx. Mild tremulousness/weakness of palate closure. Normal base of tongue, valleculae, and epiglottis. Vocal fold mobility: right: normal; left: full abduction, limited adduction. Medial edges of the true vocal folds: smooth. No focal mucosal lesions  were observed on the true vocal folds. Left with implant contour visible infraglottically under mucosa. Glissade produced appropriate elongation. There was mild to moderate supraglottic recruitment with connected speech. Mucosa of false vocal folds, aryepiglottic folds, piriform sinuses, and posterior glottis unremarkable. Airway was patent.      No additional findings on NBI.    The addition of stroboscopy allowed evaluation of the mucosal wave. Intermittent entrainment, most consistent around F# or G4 today.  Amplitude: right: normal; left: normal. Symmetry: intermittent symmetry. Closure pattern: complete. Closure plane: at glottic level. Phase distribution: open-phase predominant, but dramatically improved compared to pre-operative.                IMPRESSION AND PLAN:   Clarice Contreras returns with ongoing vocal improvement. She is very happy with her vocal outcome and people have been commenting that she sounds like herself again.    We will plan a routine follow up in 6 months or sooner as needed. At that time, we will want to recheck vocal fold mobility and palate function.    I spent a total of 22 minutes on 8/4/2023 in chart review, review of tests, patient visit, documentation, care coordination, and/or discussion with other providers about the issues documented above, separate from any documented procedure(s).      Again, thank you for allowing me to participate in the care of your patient.      Sincerely,    Serena Wood MD

## 2023-08-04 NOTE — PROGRESS NOTES
[facility]  Clinical Voice and Upper Airway Evaluation Report    Patient's Name: Clarice Contreras  Date of Evaluation: 8/4/2023  Providing SLP: Joie Black MS CCC-SLP  Seen in Conjunction With: Serena Wood MD MPH  Insurance Coverage: [employer]  Chief Complaint: Dysphonia  Evaluation Location: [facility]  Time of Evaluation: 12:04 - 12:22 PM      Impressions from initial evaluation on 8/22/22 by Joie Black MS CCC-SLP:   Clarice presents today with dysphonia beginning about 1.5 years ago during allergy season. Perceptually, her voice is moderately rough, breathy, weak, and strained with persistent diplophonia in all voice situations. Under laryngoscopy, the L vocal fold is very thin with reduced pliability and adduction to midline as well as aperiodic vibration. The R vocal fold's movement is intermittently and mildly sluggish with increased hyperfunction on this side. Stimulability with various SOVT tasks was not helpful in improving Clarice's voice quality or laryngoscopy findings today. For these reasons, voice therapy is not recommended at this time until surgical intervention and imaging have happened with Dr. Wood's guidance. Re-evaluation is recommended prior to beginning voice therapy at this time.       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 Unfortunately, there has not been a particular etiology discovered yet, despite neck CT and MRI brain scan in August 2022, which were both normal. She underwent a left vocal fold prolaryn gel injection by Dr. Wood on 12/2/22. Laryngoscopy revealed that the left vocal fold is atrophic and paretic in the paramedial position,  while the right vocal fold demonstrates compensatory mod-severe supraglottic hyperfunction, but a large glottic gap remains. The right vocal fold function is also inconsistent and occasionally demonstrates some sluggish and reduced movement, but overall displays full ROM. She perceptually demonstrated severely breathy voice quality with moderate strain and mild-moderate inconsistent roughness, large pitch fluctuation between a lower than normal register (117Hz) and higher than normal register (370Hz), poor respiratory mechanics with excessive effort and air flow, and perilaryngeal hyperfunction. She did benefit from AP and lateral laryngeal palpation along with visual feedback of air flow using a streamer. Given the inconsistent nature of her right vocal fold movement and unknown etiology, Dr. Wood expressed some hesitation to do a permanent vocal fold implant, but will plan to reassess and decide on the next step once the current prolaryn gel has fully reabsorbed.      She would benefit from a course of speech therapy targeting reduced perilaryngeal hyperfunction, improved respiratory mechanics, and strategies to optimize her voice production while reduced strain and effort. Due to concern of airway impairment should she undergo a permanent implant procedure and then potentially have worsening vocal fold function, possibly leading to one or both vocal folds stuck in the paramedial or medial position, we will also plan to instruct her in use of upper airway aBductory maneuvers and laryngeal release techniques in order to facilitate sufficient breath flow as much as possible. She was also provided information today about personal amplification devices in order to aid her in being heard and participating more fully as the prolaryn gel reabsorbs.      A course of speech therapy is recommended to improve voice quality and promote reduced discomfort, effort and fatigue.  Return to see Dr. Wood around April 2023 when  the filler should hopefully have reabsorbed. Will then re-evaluate in anticipation of surgery for vocal fold implant and will double-check scheduling of pre/post-surgical therapy.   She demonstrates a Good prognosis for improvement given adherence to therapeutic recommendations.   Positive indicators: positive response to therapy probes high level of comittment  Negative indicators: None      Impressions from most recent voice therapy session on 3/23/23 with Joie Black MS CCC-SLP:   Clarice's voice has continued to worsen since her most recent session, likely indicating that her previous vocal fold augmentation has continued to be reabsorbed. We discussed perioperative care surrounding a thyroplasty and answered Clarice's questions. She will follow-up with Dr. Wood on 4/10/23 to further discuss the possibility of this procedure, answer additional questions, and address other pre-operative needs. No voice SLP services are warranted for this visit, as she has been fully optimized from a voice and airway perspective at this time. I plan to see her about 3 week post-op to check in with regard to her voice and breathing and re-assess if further care is needed. Clarice is in agreement with this plan of care.      Impressions from most recent evaluation on 6/2/23 with Joie Black MS CCC-SLP:  Clarice is a 38-year-old female presenting today with dysphonia R49.0 secondary to L vocal fold atrophy J38.3 and laryngeal hyperfunction J38.7. Perceptually, her voice is mildly weak and strained with minimal roughness and breathiness. Laryngoscopy today demonstrated fullness and reduced pliability of the L vocal fold post-thyroplasty and improved but not complete glottic closure. Laryngeal hyperfunction is also noted. At this time, Clarice feels that her voice is continuing to improve and is at 90% of her normal today. She would like to continue working on her voice independently at this time and will return for SLP  "services if needed. She will follow-up with Dr. Wood in 6-8 weeks. Clarice is in agreement with this plan of care.       Updated patient history:  Clarice reports that her voice has returned to her normal status! She denies vocal fatigue, dyspnea, dysphagia, coughing/throat clearing, or other laryngeal symptoms. At this time, no skilled SLP services were required today, leading to a no charge facility fee. She will continue to follow-up with Dr. Wood as recommended.      Quality of Life Questionnaires:    Patient Supplied Answers To Last 2 VHI Questionnaires      3/23/2023     9:27 AM 2023    10:26 AM   Voice Handicap Index (VHI-10)   My voice makes it difficult for people to hear me 3 3   People have difficulty understanding me in a noisy room 4 4   My voice difficulties restrict my personal and social life.  1 2   I feel left out of conversations because of my voice 3 2   My voice problem causes me to lose income 1 0   I feel as though I have to strain to produce voice 3 3   The clarity of my voice is unpredictable 3 3   My voice problem upsets me 2 2   My voice makes me feel handicapped 2 2   People ask, \"What's wrong with your voice?\" 4 4   VHI-10 26 25       Thank you for allowing me to participate in this patient's care,    Joie Black MS CCC-SLP  Speech-Language Pathologist  [facility]  Department of Otolaryngology - Head and Neck Surgery  [facility]  [email or web address]  Direct: [phone]  [phone]    *This note may have been completed using [id number] dictation software, so errors may exist. Please contact me for clarification if needed*  "

## 2023-08-04 NOTE — PATIENT INSTRUCTIONS
1.  You were seen in the ENT Clinic today by . If you have any questions or concerns after your appointment, please call [phone]. Press option #1 for scheduling related needs. Press option #3 for Nurse advice.    2. Plan is to return to clinic in 6 months      Rayna Vyas LPN  [phone]  [facility] - Otolaryngology

## 2024-08-31 ENCOUNTER — HEALTH MAINTENANCE LETTER (OUTPATIENT)
Age: 39
End: 2024-08-31

## 2025-03-29 ENCOUNTER — HEALTH MAINTENANCE LETTER (OUTPATIENT)
Age: 40
End: 2025-03-29
